# Patient Record
Sex: MALE | Race: WHITE
[De-identification: names, ages, dates, MRNs, and addresses within clinical notes are randomized per-mention and may not be internally consistent; named-entity substitution may affect disease eponyms.]

---

## 2017-05-06 ENCOUNTER — HOSPITAL ENCOUNTER (EMERGENCY)
Dept: HOSPITAL 10 - FTE | Age: 22
Discharge: LEFT BEFORE DISCHARGE | End: 2017-05-06
Payer: SELF-PAY

## 2017-05-06 DIAGNOSIS — Z53.21: Primary | ICD-10-CM

## 2017-05-12 ENCOUNTER — HOSPITAL ENCOUNTER (INPATIENT)
Dept: HOSPITAL 10 - FTE | Age: 22
LOS: 15 days | Discharge: HOME | DRG: 443 | End: 2017-05-27
Attending: INTERNAL MEDICINE | Admitting: INTERNAL MEDICINE
Payer: COMMERCIAL

## 2017-05-12 VITALS
WEIGHT: 144.4 LBS | HEIGHT: 71 IN | HEIGHT: 71 IN | WEIGHT: 144.4 LBS | BODY MASS INDEX: 20.22 KG/M2 | BODY MASS INDEX: 20.22 KG/M2

## 2017-05-12 DIAGNOSIS — R97.0: ICD-10-CM

## 2017-05-12 DIAGNOSIS — R97.8: ICD-10-CM

## 2017-05-12 DIAGNOSIS — K75.4: Primary | ICD-10-CM

## 2017-05-12 DIAGNOSIS — M13.0: ICD-10-CM

## 2017-05-12 DIAGNOSIS — D64.9: ICD-10-CM

## 2017-05-12 DIAGNOSIS — R63.4: ICD-10-CM

## 2017-05-12 LAB
ADD SCAN DIFF: NO
ALBUMIN SERPL-MCNC: 2.6 G/DL (ref 3.3–4.9)
ALBUMIN/GLOB SERPL: 0.6 {RATIO}
ALP SERPL-CCNC: 239 IU/L (ref 42–121)
ALT SERPL-CCNC: 574 IU/L (ref 13–69)
ANION GAP SERPL CALC-SCNC: 12 MMOL/L (ref 8–16)
AST SERPL-CCNC: 735 IU/L (ref 15–46)
BASOPHILS # BLD AUTO: 0 10^3/UL (ref 0–0.1)
BASOPHILS NFR BLD: 0.3 % (ref 0–2)
BILIRUB DIRECT SERPL-MCNC: 3.6 MG/DL (ref 0–0.2)
BILIRUB SERPL-MCNC: 4.8 MG/DL (ref 0.2–1.3)
BUN SERPL-MCNC: 9 MG/DL (ref 7–20)
CALCIUM SERPL-MCNC: 8.4 MG/DL (ref 8.4–10.2)
CHLORIDE SERPL-SCNC: 98 MMOL/L (ref 97–110)
CO2 SERPL-SCNC: 29 MMOL/L (ref 21–31)
CREAT SERPL-MCNC: 0.58 MG/DL (ref 0.61–1.24)
DEPRECATED HBV CORE AB SER IA-ACNC: NEGATIVE
EOSINOPHIL # BLD: 0.4 10^3/UL (ref 0–0.5)
EOSINOPHIL NFR BLD: 6.1 % (ref 0–7)
ERYTHROCYTE [DISTWIDTH] IN BLOOD BY AUTOMATED COUNT: 15.7 % (ref 11.5–14.5)
GLOBULIN SER-MCNC: 4.3 G/DL (ref 1.3–3.2)
GLUCOSE SERPL-MCNC: 87 MG/DL (ref 70–220)
HAAIG REFLEX: (no result)
HCT VFR BLD CALC: 41.7 % (ref 42–52)
HGB BLD-MCNC: 13.7 G/DL (ref 14–18)
INR PPP: 1.39
LYMPHOCYTES # BLD AUTO: 1.6 10^3/UL (ref 0.8–2.9)
LYMPHOCYTES NFR BLD AUTO: 24.9 % (ref 15–51)
MCH RBC QN AUTO: 28.2 PG (ref 29–33)
MCHC RBC AUTO-ENTMCNC: 32.9 G/DL (ref 32–37)
MCV RBC AUTO: 86 FL (ref 82–101)
MONOCYTES # BLD: 0.9 10^3/UL (ref 0.3–0.9)
MONOCYTES NFR BLD: 13.1 % (ref 0–11)
NEUTROPHILS # BLD: 3.6 10^3/UL (ref 1.6–7.5)
NEUTROPHILS NFR BLD AUTO: 55.1 % (ref 39–77)
NRBC # BLD MANUAL: 0 10^3/UL (ref 0–0)
NRBC BLD QL: 0 /100WBC (ref 0–0)
PLATELET # BLD: 200 10^3/UL (ref 140–415)
PMV BLD AUTO: 11.7 FL (ref 7.4–10.4)
POTASSIUM SERPL-SCNC: 4.1 MMOL/L (ref 3.5–5.1)
PROT SERPL-MCNC: 6.9 G/DL (ref 6.1–8.1)
PROTHROMBIN TIME: 17.1 SEC (ref 12.2–14.2)
PT RATIO: 1.3
RBC # BLD AUTO: 4.85 10^6/UL (ref 4.7–6.1)
SODIUM SERPL-SCNC: 135 MMOL/L (ref 135–144)
WBC # BLD AUTO: 6.5 10^3/UL (ref 4.8–10.8)

## 2017-05-12 PROCEDURE — 85610 PROTHROMBIN TIME: CPT

## 2017-05-12 PROCEDURE — 74181 MRI ABDOMEN W/O CONTRAST: CPT

## 2017-05-12 PROCEDURE — 86226 DNA ANTIBODY SINGLE STRAND: CPT

## 2017-05-12 PROCEDURE — 83010 ASSAY OF HAPTOGLOBIN QUANT: CPT

## 2017-05-12 PROCEDURE — 82105 ALPHA-FETOPROTEIN SERUM: CPT

## 2017-05-12 PROCEDURE — 84450 TRANSFERASE (AST) (SGOT): CPT

## 2017-05-12 PROCEDURE — P9059 PLASMA, FRZ BETWEEN 8-24HOUR: HCPCS

## 2017-05-12 PROCEDURE — 86803 HEPATITIS C AB TEST: CPT

## 2017-05-12 PROCEDURE — 81003 URINALYSIS AUTO W/O SCOPE: CPT

## 2017-05-12 PROCEDURE — 84100 ASSAY OF PHOSPHORUS: CPT

## 2017-05-12 PROCEDURE — 82607 VITAMIN B-12: CPT

## 2017-05-12 PROCEDURE — 84443 ASSAY THYROID STIM HORMONE: CPT

## 2017-05-12 PROCEDURE — 96375 TX/PRO/DX INJ NEW DRUG ADDON: CPT

## 2017-05-12 PROCEDURE — 76705 ECHO EXAM OF ABDOMEN: CPT

## 2017-05-12 PROCEDURE — 86301 IMMUNOASSAY TUMOR CA 19-9: CPT

## 2017-05-12 PROCEDURE — 83615 LACTATE (LD) (LDH) ENZYME: CPT

## 2017-05-12 PROCEDURE — 87496 CYTOMEG DNA AMP PROBE: CPT

## 2017-05-12 PROCEDURE — 82248 BILIRUBIN DIRECT: CPT

## 2017-05-12 PROCEDURE — 86880 COOMBS TEST DIRECT: CPT

## 2017-05-12 PROCEDURE — 86709 HEPATITIS A IGM ANTIBODY: CPT

## 2017-05-12 PROCEDURE — 86885 COOMBS TEST INDIRECT QUAL: CPT

## 2017-05-12 PROCEDURE — 87338 HPYLORI STOOL AG IA: CPT

## 2017-05-12 PROCEDURE — 83921 ORGANIC ACID SINGLE QUANT: CPT

## 2017-05-12 PROCEDURE — 84075 ASSAY ALKALINE PHOSPHATASE: CPT

## 2017-05-12 PROCEDURE — 85045 AUTOMATED RETICULOCYTE COUNT: CPT

## 2017-05-12 PROCEDURE — 74177 CT ABD & PELVIS W/CONTRAST: CPT

## 2017-05-12 PROCEDURE — 36430 TRANSFUSION BLD/BLD COMPNT: CPT

## 2017-05-12 PROCEDURE — 76942 ECHO GUIDE FOR BIOPSY: CPT

## 2017-05-12 PROCEDURE — 74330 X-RAY BILE/PANC ENDOSCOPY: CPT

## 2017-05-12 PROCEDURE — 36415 COLL VENOUS BLD VENIPUNCTURE: CPT

## 2017-05-12 PROCEDURE — 86255 FLUORESCENT ANTIBODY SCREEN: CPT

## 2017-05-12 PROCEDURE — 83540 ASSAY OF IRON: CPT

## 2017-05-12 PROCEDURE — 87340 HEPATITIS B SURFACE AG IA: CPT

## 2017-05-12 PROCEDURE — 85651 RBC SED RATE NONAUTOMATED: CPT

## 2017-05-12 PROCEDURE — 86901 BLOOD TYPING SEROLOGIC RH(D): CPT

## 2017-05-12 PROCEDURE — 86703 HIV-1/HIV-2 1 RESULT ANTBDY: CPT

## 2017-05-12 PROCEDURE — 86850 RBC ANTIBODY SCREEN: CPT

## 2017-05-12 PROCEDURE — 84460 ALANINE AMINO (ALT) (SGPT): CPT

## 2017-05-12 PROCEDURE — 86038 ANTINUCLEAR ANTIBODIES: CPT

## 2017-05-12 PROCEDURE — 85730 THROMBOPLASTIN TIME PARTIAL: CPT

## 2017-05-12 PROCEDURE — 80076 HEPATIC FUNCTION PANEL: CPT

## 2017-05-12 PROCEDURE — 82378 CARCINOEMBRYONIC ANTIGEN: CPT

## 2017-05-12 PROCEDURE — 83690 ASSAY OF LIPASE: CPT

## 2017-05-12 PROCEDURE — 83735 ASSAY OF MAGNESIUM: CPT

## 2017-05-12 PROCEDURE — 82728 ASSAY OF FERRITIN: CPT

## 2017-05-12 PROCEDURE — 82247 BILIRUBIN TOTAL: CPT

## 2017-05-12 PROCEDURE — 88313 SPECIAL STAINS GROUP 2: CPT

## 2017-05-12 PROCEDURE — 80061 LIPID PANEL: CPT

## 2017-05-12 PROCEDURE — 88307 TISSUE EXAM BY PATHOLOGIST: CPT

## 2017-05-12 PROCEDURE — 82150 ASSAY OF AMYLASE: CPT

## 2017-05-12 PROCEDURE — 83090 ASSAY OF HOMOCYSTEINE: CPT

## 2017-05-12 PROCEDURE — 96374 THER/PROPH/DIAG INJ IV PUSH: CPT

## 2017-05-12 PROCEDURE — 86704 HEP B CORE ANTIBODY TOTAL: CPT

## 2017-05-12 PROCEDURE — 86664 EPSTEIN-BARR NUCLEAR ANTIGEN: CPT

## 2017-05-12 PROCEDURE — 80053 COMPREHEN METABOLIC PANEL: CPT

## 2017-05-12 PROCEDURE — 86140 C-REACTIVE PROTEIN: CPT

## 2017-05-12 PROCEDURE — 85025 COMPLETE CBC W/AUTO DIFF WBC: CPT

## 2017-05-12 PROCEDURE — 86900 BLOOD TYPING SEROLOGIC ABO: CPT

## 2017-05-12 PROCEDURE — 82746 ASSAY OF FOLIC ACID SERUM: CPT

## 2017-05-12 RX ADMIN — ALBUTEROL SULFATE SCH PUFF: 90 AEROSOL, METERED RESPIRATORY (INHALATION) at 02:30

## 2017-05-12 NOTE — RADRPT
PROCEDURE:   US Abdomen. 

 

CLINICAL INDICATION:   abdominal pain 

 

TECHNIQUE:   Multiple real-time images were acquired of the patient's right upper quadrant abdomen a
nd retroperitoneum utilizing a high resolution transducer. 

 

COMPARISON:   None 

 

FINDINGS:

 

The liver demonstrates normal echogenicity.  The liver is normal in size and no focal solid lesions 
are seen. The liver measures 15.2 cm in length. The portal vein is patent with normal direction of f
low.  No intrahepatic biliary dilatation is seen. 

 

The gallbladder is contracted and not well seen.  No gallstones are identified within the gallbladde
r.  There is no pericholecystic fluid or gallbladder wall thickening. The common bile duct measures 
3 mm in maximal dimension.  

 

The pancreas appears enlarged and prominent.

 

No free fluid is identified.

 

The right kidney is normal in size, and demonstrate normal echogenicity and cortical thickness.  The
 right kidney measures 10.4 cm in long dimension.  There is no evidence of hydronephrosis. There are
 no kidney stones.  

 

There is a trace amount of right perinephric fluid.

 

 

RPTAT: AA

 

IMPRESSION:

 

Limited visualization of a contracted gallbladder.

No evidence of gallstones.

Possible trace amount of right perinephric fluid.

Enlarged and prominent pancreas.  Rule out pancreatitis.  Further evaluation with CT is recommended.

_____________________________________________ 

.Tyler Jean MD, MD           Date    Time 

Electronically viewed and signed by .Tyler Jean MD, MD on 05/12/2017 18:15 

 

D:  05/12/2017 18:15  T:  05/12/2017 18:15

.S/

## 2017-05-12 NOTE — RADRPT
PROCEDURE:   CT Abdomen and Pelvis with contrast. 

 

CLINICAL INDICATION:   Abdominal pain jaundice 

 

TECHNIQUE:   CT scan of the abdomen and pelvis with contrast was performed on a multi-detector high-
resolution CT scanner.  The patient was scanned following the intravenous administration of 98 cc of
 Visipaque 320.  Coronal and sagittal reformatted images were obtained from the axial source images.
 Images were reviewed on a high-resolution PACS workstation. The total exam CTDI equals 4.83 mGy and
 the total exam DLP equals 308.52 mGy-cm.

 

One or more the following dose reduction techniques were utilized: Automated exposure control, adjus
tment of the mA/ or kV according to patient's size, or use of iterative reconstruction technique.

 

COMPARISON:   Right upper quadrant abdominal ultrasound of 05/12/2017 

 

FINDINGS:

 

CT abdomen:

 

The lung bases are clear.  The heart size is normal, without pericardial thickening or effusion. Non
specific mild periportal edema.  The length of the right lobe of the liver equals 20.2 cm which coul
d be secondary to Reidel's lobe, normal variant.  The spleen is normal in size and homogeneous in de
nsity.  The stomach is partially collapsed, but is grossly unremarkable.  The pancreas as visualized
 is normal.  There is appearance of likely diffuse gallbladder wall edema.  The adrenal glands are s
ymmetric and normal.  The kidneys are symmetrically unremarkable..  No renal calculus or obstructive
 uropathy or mass lesion is seen.  

 

The aorta is of normal caliber.   There is no retroperitoneal lymphadenopathy.    Small amount of as
cites.

 

CT pelvis:

 

The small bowel loops situated within the pelvis are unremarkable.  The pelvic organs are normal.  T
he pelvic sidewalls and inguinal regions are clear.  The sigmoid colon and rectum are unremarkable. 
 No mass or lymphadenopathy is seen.  Small amount of ascites.  The bladder is normal.

 

There is appearance of mild curvature of the lumbar spine with convexity to the left which could be 
positional. No osteolytic or osteoblastic lesion is detected.  

 

IMPRESSION:

Diffuse gallbladder wall edema.  Nonspecific mild periportal edema. Small amount of ascites. Finding
s could be secondary to hepatitis, hepatic congestion. Please see above.

 

 

RPTAT: HJES

_____________________________________________ 

.Luis E Ruiz MD, MD           Date    Time 

Electronically viewed and signed by .Luis E Ruiz MD, MD on 05/12/2017 20:56 

 

D:  05/12/2017 20:56  T:  05/12/2017 20:56

.S/

## 2017-05-12 NOTE — CONS
DATE OF ADMISSION: 05/12/2017

DATE OF CONSULTATION:  

 

 

 

Dear Dr. Herrera:

 

I thank you very much for this kind referral.

 

HISTORY OF PRESENT ILLNESS:  Mr. Yadira Sagastume is a 22-year-old male patient who 
has been referred to me for further evaluation of abdominal pain associated 
with vomiting.  There is no past history of peptic ulcer disease.  He is not 
taking any nonsteroidal anti-inflammatory agents.  His appetite has been poor 
and he has been losing weight.  There is no history of gallstones.  He does not 
have any fever, chills or jaundice.  There is no history of liver disease.  The 
patient denies any change in the bowel habit or rectal bleeding.  He is not a 
hypertensive or diabetic.  He does not have any heart disease or lung problem.  
There is no history of kidney disease.

 

SOCIAL HISTORY:  He is a nonsmoker.  He does not abuse alcohol.

 

FAMILY HISTORY:  Negative for gastrointestinal tract neoplasm and liver disease.

 

ALLERGIES:  THERE IS NO HISTORY OF SIGNIFICANT DRUG ALLERGY.

 

MEDICATIONS:  None.

 

PHYSICAL EXAMINATION:

GENERAL:  He is 5 feet 11 inches tall and he weighs 135 pounds.

HEART:  Examination of the heart reveals normal first and second heart sounds.

LUNGS:  Clear.

ABDOMEN:  Soft without any distention.  Liver and spleen are not palpable.  
There are no masses.  There is no focal tenderness.  Normal bowel sounds are 
heard.

CENTRAL NERVOUS SYSTEM:  Does not reveal any focal neurological deficit.

EYES:  The patient is icteric.

 

IMPRESSION:

1.  Patient has jaundice.

2.  Upper abdominal pain and vomiting.

3.  Weight loss.

 

PLAN:

1.  CMP which includes liver enzymes.

2.  Abdominal ultrasound for further evaluation.

3.  If the vomiting continues, patient will need endoscopy examination.

 

I thank you once again.

 

With warmest personal regards,

 

 

Dictated By: VALENCIA WOODS/ELIOT

DD:    05/12/2017 17:01:37

DT:    05/12/2017 17:43:54

Conf#: [id number]

DID#:  [id number]

 

[email or web address]

## 2017-05-12 NOTE — QN
Documentation


Comment


My independent concise history is jaundice.  My pertinent physical exam 

findings are jaundice.  The plan is admission to Dr. Joe as the patient has 

preferred [employer] insurance.











MAXIMINO ACOSTA MD May 12, 2017 21:50

## 2017-05-12 NOTE — HP
Date/Time of Note


Date/Time of Note


DATE: 5/12/17 


TIME: 22:05





HPI/ROS


Admit Date/Time


Admit Date/Time








Hx of Present Illness





*******************PLEASE SEE THE DICTATED H&P**************************





Exam/Review of Systems


Vital Signs


Vitals





 Vital Signs








  Date Time  Temp Pulse Resp B/P Pulse Ox O2 Delivery O2 Flow Rate FiO2


 


5/12/17 17:11 98.1 71 18 174/67 99   











Labs


Result Diagram:  


5/12/17 1835                                                                   

             5/12/17 1835














JAD MOORE MD May 12, 2017 22:05

## 2017-05-13 VITALS — SYSTOLIC BLOOD PRESSURE: 99 MMHG | RESPIRATION RATE: 18 BRPM | HEART RATE: 66 BPM | DIASTOLIC BLOOD PRESSURE: 53 MMHG

## 2017-05-13 VITALS — TEMPERATURE: 98.2 F

## 2017-05-13 VITALS — DIASTOLIC BLOOD PRESSURE: 51 MMHG | SYSTOLIC BLOOD PRESSURE: 93 MMHG | RESPIRATION RATE: 18 BRPM

## 2017-05-13 VITALS — DIASTOLIC BLOOD PRESSURE: 53 MMHG | SYSTOLIC BLOOD PRESSURE: 92 MMHG | RESPIRATION RATE: 20 BRPM

## 2017-05-13 LAB
ADD SCAN DIFF: NO
ALBUMIN SERPL-MCNC: 2 G/DL (ref 3.3–4.9)
ALBUMIN/GLOB SERPL: 0.58 {RATIO}
ALP SERPL-CCNC: 178 IU/L (ref 42–121)
ALT SERPL-CCNC: 450 IU/L (ref 13–69)
ANION GAP SERPL CALC-SCNC: 6 MMOL/L (ref 8–16)
AST SERPL-CCNC: 569 IU/L (ref 15–46)
BASOPHILS # BLD AUTO: 0 10^3/UL (ref 0–0.1)
BASOPHILS NFR BLD: 0.6 % (ref 0–2)
BILIRUB DIRECT SERPL-MCNC: 3.3 MG/DL (ref 0–0.2)
BILIRUB SERPL-MCNC: 4.5 MG/DL (ref 0.2–1.3)
BUN SERPL-MCNC: 9 MG/DL (ref 7–20)
CALCIUM SERPL-MCNC: 7.8 MG/DL (ref 8.4–10.2)
CHLORIDE SERPL-SCNC: 105 MMOL/L (ref 97–110)
CO2 SERPL-SCNC: 25 MMOL/L (ref 21–31)
CREAT SERPL-MCNC: 0.65 MG/DL (ref 0.61–1.24)
EOSINOPHIL # BLD: 0.3 10^3/UL (ref 0–0.5)
EOSINOPHIL NFR BLD: 6.7 % (ref 0–7)
ERYTHROCYTE [DISTWIDTH] IN BLOOD BY AUTOMATED COUNT: 15.7 % (ref 11.5–14.5)
GLOBULIN SER-MCNC: 3.4 G/DL (ref 1.3–3.2)
GLUCOSE SERPL-MCNC: 98 MG/DL (ref 70–220)
HCT VFR BLD CALC: 33.8 % (ref 42–52)
HGB BLD-MCNC: 11.2 G/DL (ref 14–18)
LYMPHOCYTES # BLD AUTO: 1.2 10^3/UL (ref 0.8–2.9)
LYMPHOCYTES NFR BLD AUTO: 24.5 % (ref 15–51)
MAGNESIUM SERPL-MCNC: 1.7 MG/DL (ref 1.7–2.5)
MCH RBC QN AUTO: 28.1 PG (ref 29–33)
MCHC RBC AUTO-ENTMCNC: 33.1 G/DL (ref 32–37)
MCV RBC AUTO: 84.9 FL (ref 82–101)
MONOCYTES # BLD: 0.7 10^3/UL (ref 0.3–0.9)
MONOCYTES NFR BLD: 14.6 % (ref 0–11)
NEUTROPHILS # BLD: 2.6 10^3/UL (ref 1.6–7.5)
NEUTROPHILS NFR BLD AUTO: 53.2 % (ref 39–77)
NRBC # BLD MANUAL: 0 10^3/UL (ref 0–0)
NRBC BLD QL: 0 /100WBC (ref 0–0)
PHOSPHATE SERPL-MCNC: 3.8 MG/DL (ref 2.5–4.9)
PLATELET # BLD: 157 10^3/UL (ref 140–415)
PMV BLD AUTO: 12.8 FL (ref 7.4–10.4)
POTASSIUM SERPL-SCNC: 4 MMOL/L (ref 3.5–5.1)
PROT SERPL-MCNC: 5.4 G/DL (ref 6.1–8.1)
RBC # BLD AUTO: 3.98 10^6/UL (ref 4.7–6.1)
SODIUM SERPL-SCNC: 132 MMOL/L (ref 135–144)
WBC # BLD AUTO: 4.9 10^3/UL (ref 4.8–10.8)

## 2017-05-13 RX ADMIN — ALBUTEROL SULFATE SCH PUFF: 90 AEROSOL, METERED RESPIRATORY (INHALATION) at 02:00

## 2017-05-13 RX ADMIN — FOLIC ACID SCH MLS/HR: 5 INJECTION, SOLUTION INTRAMUSCULAR; INTRAVENOUS; SUBCUTANEOUS at 11:28

## 2017-05-13 RX ADMIN — FAMOTIDINE SCH MG: 20 TABLET ORAL at 20:41

## 2017-05-13 RX ADMIN — ALBUTEROL SULFATE SCH PUFF: 90 AEROSOL, METERED RESPIRATORY (INHALATION) at 08:00

## 2017-05-13 RX ADMIN — ALBUTEROL SULFATE SCH PUFF: 90 AEROSOL, METERED RESPIRATORY (INHALATION) at 14:00

## 2017-05-13 RX ADMIN — ALBUTEROL SULFATE SCH PUFF: 90 AEROSOL, METERED RESPIRATORY (INHALATION) at 20:00

## 2017-05-13 RX ADMIN — FAMOTIDINE SCH MG: 20 TABLET ORAL at 09:28

## 2017-05-13 NOTE — RADRPT
PROCEDURE:   MRI Abdomen without intravenous contrast. 

 

CLINICAL INDICATION:   Hepatitis, abdominal pain

 

TECHNIQUE:   MRI of the abdomen was performed.  The patient was examined without IV contrast. Images
 were reviewed on a high-resolution PACS workstation.

 

COMPARISON:   CT and ultrasound, 05/12/2017  

 

FINDINGS:

 

Hepatomegaly is noted measuring 20 cm.  No focal hepatic mass is identified.  Hepatic periportal germania
ma is noted, which can be seen in the setting of hepatitis.  Gallbladder is contracted, and demonstr
ates circumferential wall edema, likely reactive secondary to underlying liver disease.  No gallston
e is seen.  Splenomegaly is noted measuring 14 cm.  Biliary tree, pancreas, adrenal glands and kidne
ys are unremarkable.  There is no obstructive uropathy.  The stomach is grossly unremarkable.

 

Abdominal aorta is normal in caliber.  There is no retroperitoneal or lacy hepatis lymphadenopathy.
  Mild upper abdominal ascites is noted.  No bowel obstruction or evidence of abscess is identified.
  The surrounding osseous structures are unremarkable.  There is mild anasarca. 

 

IMPRESSION:

 

1.  Hepatomegaly is noted.  Hepatic periportal edema is identified, which can be seen in the setting
 of hepatitis.

2.  Splenomegaly is noted measuring 14 cm.

3.  There is mild upper abdominal ascites.

4.  Circumferential gallbladder wall thickening is noted, likely reactive secondary to underlying li
natacha disease.  No evidence of cholelithiasis, cholecystitis or biliary dilatation is identified.

5.  There is mild anasarca.

 

RPTAT: QQ

_____________________________________________ 

.Lior Bai MD, MD           Date    Time 

Electronically viewed and signed by .Lior Bai MD, MD on 05/13/2017 15:27 

 

D:  05/13/2017 15:27  T:  05/13/2017 15:27

.R/

## 2017-05-13 NOTE — CONS
Date/Time of Note


Date/Time of Note


DATE: 5/13/17 


TIME: 12:28





Assessment/Plan


Assessment/Plan


Additional Assessment/Plan


Transaminitis/jaundice


Evaluate for pancreatitis versus autoimmune etiology versus transient elevation


Rule out choledocholithiasis 


IVF Hydration


Nausea and pain control


Review MRCP, autoimmune markers, hep A


ERCP if clinically indicated, pt advised of R/B/A of procedure and she provides 

informed consent to proceed


Monitor LFTs, amylase, lipase


Surgery following





Asthma


Management per Primary





Joint pain


Management per Primary


May require rheumatology consult


Autoimmune markers in process








Further recommendations depend on clinical course


Patient seen in collaboration with Dr. Pritchard





Consultation Date/Type/Reason


Admit Date/Time





Type of Consultation:  Gastroenterology


Reason for Consultation


Transaminitis





Hx of Present Illness


Mr.Eyasu Sagastume is a 22-year-old male with a history of asthma who presented to 

the emergency room for further evaluation of nausea, vomiting, and abdominal 

pain.  Patient denies hematemesis, fever, chills, diarrhea, hematochezia, sick 

contacts, and recent travel outside of the .  Patient reports intermittent 

vomiting with chronic nausea and abdominal pain for the last 2 weeks.  He also 

reports losing 4-6 pounds over this period.  He reports previous history of 

abdominal pain for the last year with 50 pound weight loss. About a year ago he 

used to weigh 185 pounds and he intentionally wanted to lose about 20 pounds, 

but he continued to lose weight because of the vomiting and the decreased p.o. 

intake. Patient also has a history of joint pains and swelling for the last 

year as well.  Patient states last travel to [facility] was a year ago.  Patient 

said he was in the process of getting additional workup for autoimmune causes 

of joint pains.  Patient denies diagnosis of lupus.





Social History


Smoking Status:  Never smoker





Exam/Review of Systems


Vital Signs


Vitals





 Vital Signs








  Date Time  Temp Pulse Resp B/P Pulse Ox O2 Delivery O2 Flow Rate FiO2


 


5/13/17 08:00 98.2 66 20 92/53 98   


 


5/13/17 03:00      Room Air  














 Intake and Output   


 


 5/12/17 5/12/17 5/13/17





 15:00 23:00 07:00


 


Intake Total   600 ml


 


Output Total   500 ml


 


Balance   100 ml











Exam


Constitutional:  alert, oriented, well developed, thin


Psych:  nl mood/affect


Head:  normocephalic


Eyes:  EOMI, nl conjunctiva, nl lids, anicteric sclera


ENMT:  nl external ears & nose, nl lips & teeth, nl nasal mucosa & septum


Respiratory:  clear to auscultation, normal air movement


Cardiovascular:  regular rate and rhythm


Gastrointestinal:  soft, non-tender


Musculoskeletal:  nl extremities to inspection


Neurological:  CNS II-XII intact





Results


Result Diagram:  


5/13/17 0501                                                                   

             5/13/17 0501





Results 24 hrs





Laboratory Tests








Test


  5/12/17


18:35 5/12/17


18:42 5/13/17


05:01


 


White Blood Count 6.5    4.9  #


 


Red Blood Count 4.85    3.98  L


 


Hemoglobin 13.7  L  11.2  L


 


Hematocrit 41.7  L  33.8  L


 


Mean Corpuscular Volume 86.0    84.9  


 


Mean Corpuscular Hemoglobin 28.2  L  28.1  L


 


Mean Corpuscular Hemoglobin


Concent 32.9  


  


  33.1  


 


 


Red Cell Distribution Width 15.7  H  15.7  H


 


Platelet Count 200    157  #


 


Mean Platelet Volume 11.7  H  12.8  H


 


Neutrophils % 55.1    53.2  


 


Lymphocytes % 24.9    24.5  


 


Monocytes % 13.1  H  14.6  H


 


Eosinophils % 6.1    6.7  


 


Basophils % 0.3    0.6  


 


Nucleated Red Blood Cells % 0.0    0.0  


 


Neutrophils # 3.6    2.6  


 


Lymphocytes # 1.6    1.2  


 


Monocytes # 0.9    0.7  


 


Eosinophils # 0.4    0.3  


 


Basophils # 0.0    0.0  


 


Nucleated Red Blood Cells # 0.0    0.0  


 


Prothrombin Time 17.1  H  


 


Prothrombin Time Ratio 1.3    


 


INR International Normalized


Ratio 1.39  


  


  


 


 


Sodium Level 135    132  L


 


Potassium Level 4.1    4.0  


 


Chloride Level 98    105  


 


Carbon Dioxide Level 29    25  


 


Anion Gap 12    6  L


 


Blood Urea Nitrogen 9    9  


 


Creatinine 0.58  L  0.65  


 


Glucose Level 87    98  


 


Calcium Level 8.4    7.8  L


 


Total Bilirubin 4.8  H  4.5  H


 


Direct Bilirubin 3.60  H  3.30  H


 


Indirect Bilirubin 1.2  H  1.2  H


 


Aspartate Amino Transf


(AST/SGOT) 735  H


  


  569  H


 


 


Alanine Aminotransferase


(ALT/SGPT) 574  H


  


  450  H


 


 


Alkaline Phosphatase 239  H  178  H


 


Total Protein 6.9    5.4  #L


 


Albumin 2.6  L  2.0  L


 


Globulin 4.30  H  3.40  H


 


Albumin/Globulin Ratio 0.60    0.58  


 


Lipase 180    


 


Hepatitis B Surface Antigen NEGATIVE    


 


Hepatitis B Core Total


Antibody NEGATIVE  


  


  


 


 


Hepatitis C Antibody NEGATIVE    


 


Bedside Urine pH (LAB)  6.0   


 


Bedside Urine Protein (LAB)  1+  H 


 


Bedside Urine Glucose (UA)  0.1%  H 


 


Bedside Urine Ketones (LAB)  Negative   


 


Bedside Urine Blood  Negative   


 


Bedside Urine Nitrite (LAB)  Negative   


 


Bedside Urine Leukocyte


Esterase (L 


  Negative  


  


 


 


Phosphorus Level   3.8  


 


Magnesium Level   1.7  


 


HIV (1&2) Antibody   NEGATIVE  











Medications


Medications





 Current Medications


Ondansetron HCl (Zofran Inj) 4 mg Q6H  PRN IV NAUSEA AND/OR VOMITING;  Start 5/ 12/17 at 22:30


Morphine Sulfate (morphine) 2 mg Q4H  PRN IV SEVERE PAIN LEVEL 7-10;  Start 5/12 /17 at 22:30


Famotidine 20 mg 20 mg Q12 PO  Last administered on 5/13/17at 09:28; Admin Dose 

20 MG;  Start 5/13/17 at 09:00


Dextrose/Sodium Chloride 1,000 ml @  100 mls/hr Q10H IV ;  Start 5/13/17 at 09:

00


Sodium Chloride (NS) 1,000 ml @  100 mls/hr Q10H IV  Last administered on 5/13/ 17at 11:28; Admin Dose 100 MLS/HR;  Start 5/13/17 at 11:30;  Stop 5/14/17 at 07:

29











LARS CASTRO May 13, 2017 12:28

## 2017-05-13 NOTE — HP
DATE OF ADMISSION: 05/12/2017

 

TIME SEEN:  2300

 

CHIEF COMPLAINT:  Abdominal pain, vomiting, jaundice, and swollen hands.

 

HISTORY OF PRESENT ILLNESS:  The patient is 22-year-old [place] male with a history of asthma who 
presented to the emergency department with the above stated chief complaint.  He stated starting abo
ut 2 weeks ago he started noticing his eyes turning yellow, and about a week ago he started experien
cing abdominal pain, mainly in the right upper quadrant in the epigastric area.  He also started not
icing dark urine over the past 2 weeks.  He was actually referred to Dr. Delcid, the gastroenterolo
[facility], who actually already saw the patient.  The patient further stated that he has been well up unt
il about a year ago when he started noticing that both of his hands were swollen.  At that time, he 
also started having joint pains and generalized weakness.  He did see a doctor who told him just to 
exercise his hands.  Until this day, his hands are somehow swollen and he cannot make a fist because
 of pain and also there is some limitation.  He did travel back to [facility] for a visit about a year
 ago.  At that time had minimal diarrhea, which resolved on its own after a few days.  Along with hi
s abdominal pain over the past 2 weeks, the patient also has been vomiting which is nonbilious, nonb
loody.  The patient also stated that he had lost 50 pounds in 1 year.  About a year ago he used to w
eigh 185 pounds and he intentionally wanted to lose about 20 pounds, but he continued to lose weight
 because of the vomiting and the decreased p.o. intake.

 

When the patient presented to the ER, his blood pressure was 174/67, heart rate 71, respiratory rate
 18, temperature 98.1, oxygen saturation 99% on room air.  CBC shows a hemoglobin of 13.7.  He has a
bnormal liver chemistries with an AST of 735, , alkaline phosphatase 239.  Total bilirubin is
 4.8 with indirect bilirubin 1.2, his albumin 2.6.  He had a right upper quadrant ultrasound which s
hows possible trace amount of right perinephric fluid, enlarged and a prominent pancreas.  His gallb
ladder was contracted causing limited visualization, but there was no evidence of gallstones.  CT ab
domen and pelvis with contrast was done which showed diffuse gallbladder wall edema.  Nonspecific mi
ld periportal edema.  A small amount of ascites.  The findings could be secondary to hepatitis or he
patic congestion.  Of note, the patient a few months ago had blood tests and at that time his AST an
d ALT were in the 90s and 80s.  His REID (antinuclear antibody) was positive and hepatitis panel show
s hepatitis C antibody and hepatitis B core antibody were negative, but the hepatitis B surface anti
body was positive.  Here, we already checked hepatitis B and C and it shows that hepatitis C antibod
y, hepatitis B core antibody, and hepatitis B surface antigen are all negative.  

 

REVIEW OF SYSTEMS:  A 12-point review of systems was performed, negative except as mentioned in HPI.

 

PAST MEDICAL HISTORY:  As per HPI.

 

PAST SURGICAL HISTORY:  Denies.

 

SOCIAL HISTORY:  Denies history of tobacco, alcohol or illicit drug use.

 

ALLERGIES:  NO KNOWN DRUG ALLERGIES.

 

HOME MEDICATIONS:  Albuterol and vitamin D. 

 

PHYSICAL EXAMINATION:

GENERAL:  The patient is lying in bed, appears weak and speaking slowly.  He is alert and oriented.

HEENT:  No obvious head deformity.  There is scleral icterus.  Also, there is dermal jaundice on his
 face.

CARDIOVASCULAR:  Regular rate and rhythm with no extra sounds.

LUNGS:  Clear.

ABDOMEN:  Soft.  There is tenderness in the right upper quadrant area and to some extent in the epig
astric area with no guarding, rebound tenderness or rigidity.

EXTREMITIES:  Both of his hands are somehow swollen and the patient cannot make a fist.  His middle 
finger also shows some deformity in that he cannot fully extend it.

NEUROLOGIC:  No focal deficits.

 

LABORATORY DATA:  Pertinent positives what as mentioned in the HPI.

 

IMAGING:  Right upper quadrant ultrasound and CT abdomen and pelvis with contrast with results as me
ntioned in the HPI.

 

IMPRESSION:

1.  Jaundice.

2.  Abdominal pain.

3.  Abnormal liver enzymes.

4.  Bilateral swollen hands.

5.  History of asthma.

 

PLAN:  As mentioned in the HPI, his hepatitis panel so far have been negative, but we are waiting fo
r the acute hepatitis panel results to come back. As mentioned in the HPI, also the patient had an A
NA that was positive from outside laboratory.  Given that his hands are swollen, he has joint pain, 
it may be possible that the patient has an autoimmune disease causing autoimmune hepatitis as well. 
 We will further do some blood tests.  The patient has been followed by Dr. Delcid.  Will talk to s
deja whether or not the patient needs a liver biopsy or whether or not we should start him on a steroi
d, Pentoxifylline.  In the meantime, we will provide pain medication and antiemetics as needed.  Cur
rently there is no sign of asthma exacerbation but he will receive breathing treatments as needed.  

 

Further workup and management per clinical course.

 

 

Dictated By: JAD SLAUGHTER/ELIOT

DD:    05/13/2017 07:36:32

DT:    05/13/2017 09:11:33

Conf#: [id number]

DID#:  [id number]

## 2017-05-13 NOTE — CONS
DATE OF ADMISSION: 05/12/2017

DATE OF CONSULTATION:  

 

 

 

TYPE OF CONSULTATION: Surgical.

 

REASON FOR CONSULTATION:  Abdominal pain and jaundice.

 

HISTORY OF PRESENT ILLNESS:  The patient is a 22-year-old gentleman who started to develop jaundice 
within the last several months.  He was followed as an outpatient by Dr. Delcid, with outpatient wo
rkup initiated.  The patient presented to the emergency room yesterday with severe abdominal pain, n
ausea and vomiting and was admitted for further evaluation and treatment.  He was noted to be clinic
ally icteric.  The patient notes that over the last year he has lost 50 pounds because of inability 
to eat and nausea and he has joint pain in both hands, resulting in weakness.  This hospitalization 
his admitting bilirubin was 4.8 with an AST of 735 and an ALT of 574, with an alkaline phosphatase o
f 239.  The patient is negative for hepatitis C, B and HIV.  The patient is admitted for further katiuska
luation.  Of note, on imagings the abdomen and pelvis CT shows diffuse gallbladder wall edema with n
onspecific mild periportal edema and a small amount of ascites.  Gallbladder ultrasound shows limite
d visualization of a contracted gallbladder, but no evidence of gallstones and a possible trace amou
nt of right perinephric fluid in a large and prominent pancreas.

 

PAST MEDICAL HISTORY:  No previous abdominal surgeries or hospitalizations.

 

REVIEW OF SYSTEMS:

HEAD, EYES, EARS, NOSE, THROAT:  Within normal limits.

PULMONARY:  No history of pneumonia or shortness of breath.

CARDIAC:  No history of chest pain, MI, or arrhythmia.

ABDOMEN:  As in the HPI.

EXTREMITIES: As in the HPI.

 

OUTPATIENT MEDICATIONS:  None.

 

ALLERGIES:  NONE.

 

PHYSICAL EXAMINATION:

GENERAL:  The patient is a thin 22-year-old, [place] male, who is awake and alert, in no acute dis
tress.

HEAD, EARS, EYES, NOSE, THROAT:  Within normal limits.  Sclerae are icteric. 

LUNGS: Clear.

HEART:  Regular rhythm.

ABDOMEN: Slightly tender in the epigastric area, without masses.

EXTREMITIES:  Unremarkable.

 

LABORATORY DATA:  As noted above.

 

PLAN:  Awaiting GI consultation.  The patient may need a liver biopsy.  There are no surgical recomm
endations at this time, as the patient's process is almost certainly entirely hepatocellular.

 

 

Dictated By: SOLA BENTON/ELIOT

DD:    05/13/2017 12:39:34

DT:    05/13/2017 13:23:42

Conf#: [id number]

DID#:  [id number]

## 2017-05-13 NOTE — PN
Date/Time of Note


Date/Time of Note


DATE: 5/13/17 


TIME: 14:26





Assessment/Plan


VTE Prophylaxis


VTE Prophylaxis Intervention:  ambulation





Lines/Catheters


IV Catheter Type (from [facility]):  Saline Lock


Urinary Cath still in place:  No





Assessment/Plan


Chief Complaint/Hosp Course


1.  Jaundice with hepatitis


-Workup for autoimmune hepatitis is pending, hepatitis viral panel is negative


-GI consult is appreciated


2.  Joint pain with swollen hands-this is a chronic issue


-Patient will need outpatient follow-up with rheumatologist, autoimmune workup 

is pending


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

stranded DNA


3.  History of asthma-stable





Prophylaxis: Ambulation


Problems:  





Subjective


24 Hr Interval Summary


Musculoskeletal:  bone/joint pain





Exam/Review of Systems


Vital Signs


Vitals





 Vital Signs








  Date Time  Temp Pulse Resp B/P Pulse Ox O2 Delivery O2 Flow Rate FiO2


 


5/13/17 08:00 98.2 66 20 92/53 98   


 


5/13/17 03:00      Room Air  














 Intake and Output   


 


 5/12/17 5/12/17 5/13/17





 15:00 23:00 07:00


 


Intake Total   600 ml


 


Output Total   500 ml


 


Balance   100 ml











Exam


Constitutional:  alert, oriented


Eyes:  icteric


Respiratory:  clear to auscultation


Cardiovascular:  regular rate and rhythm


Gastrointestinal:  soft, 


   No distended


Musculoskeletal:  nl extremities to inspection





Results


Result Diagram:  


5/13/17 0501                                                                   

             5/13/17 0501





Results 24 hrs





Laboratory Tests








Test


  5/12/17


18:35 5/12/17


18:42 5/13/17


05:01


 


White Blood Count 6.5    4.9  #


 


Red Blood Count 4.85    3.98  L


 


Hemoglobin 13.7  L  11.2  L


 


Hematocrit 41.7  L  33.8  L


 


Mean Corpuscular Volume 86.0    84.9  


 


Mean Corpuscular Hemoglobin 28.2  L  28.1  L


 


Mean Corpuscular Hemoglobin


Concent 32.9  


  


  33.1  


 


 


Red Cell Distribution Width 15.7  H  15.7  H


 


Platelet Count 200    157  #


 


Mean Platelet Volume 11.7  H  12.8  H


 


Neutrophils % 55.1    53.2  


 


Lymphocytes % 24.9    24.5  


 


Monocytes % 13.1  H  14.6  H


 


Eosinophils % 6.1    6.7  


 


Basophils % 0.3    0.6  


 


Nucleated Red Blood Cells % 0.0    0.0  


 


Neutrophils # 3.6    2.6  


 


Lymphocytes # 1.6    1.2  


 


Monocytes # 0.9    0.7  


 


Eosinophils # 0.4    0.3  


 


Basophils # 0.0    0.0  


 


Nucleated Red Blood Cells # 0.0    0.0  


 


Prothrombin Time 17.1  H  


 


Prothrombin Time Ratio 1.3    


 


INR International Normalized


Ratio 1.39  


  


  


 


 


Sodium Level 135    132  L


 


Potassium Level 4.1    4.0  


 


Chloride Level 98    105  


 


Carbon Dioxide Level 29    25  


 


Anion Gap 12    6  L


 


Blood Urea Nitrogen 9    9  


 


Creatinine 0.58  L  0.65  


 


Glucose Level 87    98  


 


Calcium Level 8.4    7.8  L


 


Total Bilirubin 4.8  H  4.5  H


 


Direct Bilirubin 3.60  H  3.30  H


 


Indirect Bilirubin 1.2  H  1.2  H


 


Aspartate Amino Transf


(AST/SGOT) 735  H


  


  569  H


 


 


Alanine Aminotransferase


(ALT/SGPT) 574  H


  


  450  H


 


 


Alkaline Phosphatase 239  H  178  H


 


Total Protein 6.9    5.4  #L


 


Albumin 2.6  L  2.0  L


 


Globulin 4.30  H  3.40  H


 


Albumin/Globulin Ratio 0.60    0.58  


 


Lipase 180    


 


Hepatitis B Surface Antigen NEGATIVE    


 


Hepatitis B Core Total


Antibody NEGATIVE  


  


  


 


 


Hepatitis C Antibody NEGATIVE    


 


Bedside Urine pH (LAB)  6.0   


 


Bedside Urine Protein (LAB)  1+  H 


 


Bedside Urine Glucose (UA)  0.1%  H 


 


Bedside Urine Ketones (LAB)  Negative   


 


Bedside Urine Blood  Negative   


 


Bedside Urine Nitrite (LAB)  Negative   


 


Bedside Urine Leukocyte


Esterase (L 


  Negative  


  


 


 


Phosphorus Level   3.8  


 


Magnesium Level   1.7  


 


CA 19-9 Antigen   869.0  H


 


HIV (1&2) Antibody   NEGATIVE  











Medications


Medications





 Current Medications


Ondansetron HCl (Zofran Inj) 4 mg Q6H  PRN IV NAUSEA AND/OR VOMITING;  Start 5/ 12/17 at 22:30


Morphine Sulfate (morphine) 2 mg Q4H  PRN IV SEVERE PAIN LEVEL 7-10;  Start 5/12 /17 at 22:30


Famotidine 20 mg 20 mg Q12 PO  Last administered on 5/13/17at 09:28; Admin Dose 

20 MG;  Start 5/13/17 at 09:00


Sodium Chloride (NS) 1,000 ml @  100 mls/hr Q10H IV  Last administered on 5/13/ 17at 11:28; Admin Dose 100 MLS/HR;  Start 5/13/17 at 11:30;  Stop 5/14/17 at 07:

29











ANTONIO ALEMAN May 13, 2017 14:29

## 2017-05-14 VITALS — SYSTOLIC BLOOD PRESSURE: 95 MMHG | DIASTOLIC BLOOD PRESSURE: 58 MMHG | RESPIRATION RATE: 16 BRPM

## 2017-05-14 VITALS — SYSTOLIC BLOOD PRESSURE: 85 MMHG | RESPIRATION RATE: 18 BRPM | DIASTOLIC BLOOD PRESSURE: 50 MMHG

## 2017-05-14 LAB
ADD SCAN DIFF: NO
ALBUMIN SERPL-MCNC: 2 G/DL (ref 3.3–4.9)
ALBUMIN/GLOB SERPL: 0.58 {RATIO}
ALP SERPL-CCNC: 176 IU/L (ref 42–121)
ALT SERPL-CCNC: 429 IU/L (ref 13–69)
ANION GAP SERPL CALC-SCNC: 5 MMOL/L (ref 8–16)
AST SERPL-CCNC: 572 IU/L (ref 15–46)
BASOPHILS # BLD AUTO: 0 10^3/UL (ref 0–0.1)
BASOPHILS NFR BLD: 0.3 % (ref 0–2)
BILIRUB DIRECT SERPL-MCNC: 3.3 MG/DL (ref 0–0.2)
BILIRUB SERPL-MCNC: 4.2 MG/DL (ref 0.2–1.3)
BUN SERPL-MCNC: 9 MG/DL (ref 7–20)
CALCIUM SERPL-MCNC: 7.8 MG/DL (ref 8.4–10.2)
CHLORIDE SERPL-SCNC: 105 MMOL/L (ref 97–110)
CO2 SERPL-SCNC: 26 MMOL/L (ref 21–31)
CREAT SERPL-MCNC: 0.57 MG/DL (ref 0.61–1.24)
EOSINOPHIL # BLD: 0.5 10^3/UL (ref 0–0.5)
EOSINOPHIL NFR BLD: 7.8 % (ref 0–7)
ERYTHROCYTE [DISTWIDTH] IN BLOOD BY AUTOMATED COUNT: 15.7 % (ref 11.5–14.5)
GLOBULIN SER-MCNC: 3.4 G/DL (ref 1.3–3.2)
GLUCOSE SERPL-MCNC: 81 MG/DL (ref 70–220)
HCT VFR BLD CALC: 33.3 % (ref 42–52)
HGB BLD-MCNC: 11.3 G/DL (ref 14–18)
LYMPHOCYTES # BLD AUTO: 1.8 10^3/UL (ref 0.8–2.9)
LYMPHOCYTES NFR BLD AUTO: 30.5 % (ref 15–51)
MCH RBC QN AUTO: 28.5 PG (ref 29–33)
MCHC RBC AUTO-ENTMCNC: 33.9 G/DL (ref 32–37)
MCV RBC AUTO: 84.1 FL (ref 82–101)
MONOCYTES # BLD: 0.8 10^3/UL (ref 0.3–0.9)
MONOCYTES NFR BLD: 13.3 % (ref 0–11)
NEUTROPHILS # BLD: 2.8 10^3/UL (ref 1.6–7.5)
NEUTROPHILS NFR BLD AUTO: 47.9 % (ref 39–77)
NRBC # BLD MANUAL: 0 10^3/UL (ref 0–0)
NRBC BLD QL: 0 /100WBC (ref 0–0)
PLATELET # BLD: 166 10^3/UL (ref 140–415)
PMV BLD AUTO: 12.2 FL (ref 7.4–10.4)
POTASSIUM SERPL-SCNC: 4.1 MMOL/L (ref 3.5–5.1)
PROT SERPL-MCNC: 5.4 G/DL (ref 6.1–8.1)
RBC # BLD AUTO: 3.96 10^6/UL (ref 4.7–6.1)
SODIUM SERPL-SCNC: 132 MMOL/L (ref 135–144)
WBC # BLD AUTO: 5.8 10^3/UL (ref 4.8–10.8)

## 2017-05-14 RX ADMIN — FAMOTIDINE SCH MG: 20 TABLET ORAL at 20:48

## 2017-05-14 RX ADMIN — ALBUTEROL SULFATE SCH PUFF: 90 AEROSOL, METERED RESPIRATORY (INHALATION) at 02:00

## 2017-05-14 RX ADMIN — ALBUTEROL SULFATE SCH PUFF: 90 AEROSOL, METERED RESPIRATORY (INHALATION) at 13:37

## 2017-05-14 RX ADMIN — ALBUTEROL SULFATE SCH PUFF: 90 AEROSOL, METERED RESPIRATORY (INHALATION) at 08:00

## 2017-05-14 RX ADMIN — ALBUTEROL SULFATE SCH PUFF: 90 AEROSOL, METERED RESPIRATORY (INHALATION) at 20:00

## 2017-05-14 RX ADMIN — FAMOTIDINE SCH MG: 20 TABLET ORAL at 08:26

## 2017-05-14 RX ADMIN — FOLIC ACID SCH MLS/HR: 5 INJECTION, SOLUTION INTRAMUSCULAR; INTRAVENOUS; SUBCUTANEOUS at 00:27

## 2017-05-14 NOTE — PN
Date/Time of Note


Date/Time of Note


DATE: 5/14/17 


TIME: 17:30





Assessment/Plan


VTE Prophylaxis


VTE Prophylaxis Intervention:  ambulation





Lines/Catheters


IV Catheter Type (from [facility]):  Peripheral IV


Urinary Cath still in place:  No





Assessment/Plan


Chief Complaint/Hosp Course


1.  Jaundice with hepatitis


-LFTs have trended down since admission


-Workup for autoimmune hepatitis is pending, hepatitis viral panel is negative


-GI consult is appreciated


-MRCP shows hepatitis and splenomegaly otherwise no significant findings


2.  Joint pain with swollen hands-this is a chronic issue


-Patient will need outpatient follow-up with rheumatologist, autoimmune workup 

is pending


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

stranded DNA


3.  History of asthma-stable





Prophylaxis: Ambulation


Problems:  





Subjective


24 Hr Interval Summary


Constitutional:  no complaints





Exam/Review of Systems


Vital Signs


Vitals





 Vital Signs








  Date Time  Temp Pulse Resp B/P Pulse Ox O2 Delivery O2 Flow Rate FiO2


 


5/14/17 08:08 98.1 82 18 85/50 96   


 


5/13/17 03:00      Room Air  














 Intake and Output   


 


 5/13/17 5/13/17 5/14/17





 15:00 23:00 07:00


 


Intake Total  900 ml 1600 ml


 


Balance  900 ml 1600 ml











Exam


Constitutional:  alert, oriented


Respiratory:  clear to auscultation


Cardiovascular:  regular rate and rhythm


Gastrointestinal:  non-tender, soft, 


   No distended


Musculoskeletal:  nl extremities to inspection





Results


Result Diagram:  


5/14/17 0412                                                                   

             5/14/17 0412





Results 24 hrs





Laboratory Tests








Test


  5/14/17


04:12


 


White Blood Count 5.8  


 


Red Blood Count 3.96  L


 


Hemoglobin 11.3  L


 


Hematocrit 33.3  L


 


Mean Corpuscular Volume 84.1  


 


Mean Corpuscular Hemoglobin 28.5  L


 


Mean Corpuscular Hemoglobin


Concent 33.9  


 


 


Red Cell Distribution Width 15.7  H


 


Platelet Count 166  


 


Mean Platelet Volume 12.2  H


 


Neutrophils % 47.9  


 


Lymphocytes % 30.5  


 


Monocytes % 13.3  H


 


Eosinophils % 7.8  H


 


Basophils % 0.3  


 


Nucleated Red Blood Cells % 0.0  


 


Neutrophils # 2.8  


 


Lymphocytes # 1.8  


 


Monocytes # 0.8  


 


Eosinophils # 0.5  


 


Basophils # 0.0  


 


Nucleated Red Blood Cells # 0.0  


 


Sodium Level 132  L


 


Potassium Level 4.1  


 


Chloride Level 105  


 


Carbon Dioxide Level 26  


 


Anion Gap 5  L


 


Blood Urea Nitrogen 9  


 


Creatinine 0.57  L


 


Glucose Level 81  


 


Calcium Level 7.8  L


 


Magnesium Level 1.5  L


 


Total Bilirubin 4.2  H


 


Direct Bilirubin 3.30  H


 


Indirect Bilirubin 0.9  


 


Aspartate Amino Transf


(AST/SGOT) 572  H


 


 


Alanine Aminotransferase


(ALT/SGPT) 429  H


 


 


Alkaline Phosphatase 176  H


 


Total Protein 5.4  L


 


Albumin 2.0  L


 


Globulin 3.40  H


 


Albumin/Globulin Ratio 0.58  











Medications


Medications





 Current Medications


Ondansetron HCl (Zofran Inj) 4 mg Q6H  PRN IV NAUSEA AND/OR VOMITING;  Start 5/ 12/17 at 22:30


Morphine Sulfate (morphine) 2 mg Q4H  PRN IV SEVERE PAIN LEVEL 7-10;  Start 5/12 /17 at 22:30


Famotidine 20 mg 20 mg Q12 PO  Last administered on 5/14/17at 08:26; Admin Dose 

20 MG;  Start 5/13/17 at 09:00


Magnesium Sulfate (Magnesium Sulfate 4 Gm/100 ml) 100 ml @  25 mls/hr ONCE  

ONCE IVPB  Last administered on 5/14/17at 14:43; Admin Dose 25 MLS/HR;  Start 5/ 14/17 at 14:00;  Stop 5/14/17 at 17:59











ANTONIO ALEMAN May 14, 2017 17:34

## 2017-05-14 NOTE — PN
DATE:  05/14/2017

 

 

SUBJECTIVE:  Clinically, there is no significant change.  The patient still has poor appetite.  

 

LABORATORY DATA:  His LFTs essentially remain unchanged.  Of note is the fact that the patient's CA 
19-9 is 869 and CEA is 7.2.

 

PLAN:  Continue workup and evaluation by GI.

 

There are no new surgical recommendations at this time.  I will sign off and see again p.r.n. at you
r request.

 

 

Dictated By: SOLA BENTON/ELIOT

DD:    05/14/2017 08:44:57

DT:    05/14/2017 08:52:32

Conf#: [id number]

DID#:  [id number]

## 2017-05-14 NOTE — CONS
Date/Time of Note


Date/Time of Note


DATE: 5/14/17 


TIME: 12:07





Assessment/Plan


Assessment/Plan


Chief Complaint/Hosp Course


Mr.Eyasu Sagastume is a 22-year-old male with a history of asthma who presented to 

the emergency room for further evaluation of nausea, vomiting, and abdominal 

pain.  Patient denies hematemesis, fever, chills, diarrhea, hematochezia, sick 

contacts, and recent travel outside of the [place].  Patient reports intermittent 

vomiting with chronic nausea and abdominal pain for the last 2 weeks.  He also 

reports losing 4-6 pounds over this period.  He reports previous history of 

abdominal pain for the last year with 50 pound weight loss. About a year ago he 

used to weigh 185 pounds and he intentionally wanted to lose about 20 pounds, 

but he continued to lose weight because of the vomiting and the decreased p.o. 

intake. Patient also has a history of joint pains and swelling for the last 

year as well.  Patient states last travel to [facility] was a year ago.  Patient 

said he was in the process of getting additional workup for autoimmune causes 

of joint pains.  Patient denies diagnosis of lupus.


Problems:  


Additional Assessment/Plan


Transaminitis/jaundice


Evaluate for pancreatitis versus autoimmune etiology versus transient elevation


Rule out choledocholithiasis 


IVF Hydration


Nausea and pain control


MRCP: 


1.  Hepatomegaly is noted.  Hepatic periportal edema is identified, which can 

be seen in the setting of hepatitis.


2.  Splenomegaly is noted measuring 14 cm.


3.  There is mild upper abdominal ascites.


4.  Circumferential gallbladder wall thickening is noted, likely reactive 

secondary to underlying liver disease.  No evidence of cholelithiasis, 

cholecystitis or biliary dilatation is identified.


5.  There is mild anasarca.   


ERCP if clinically indicated, pt advised of R/B/A of procedure and she provides 

informed consent to proceed


Monitor LFTs, amylase, lipase


Surgery following





Asthma


Management per Primary





Joint pain


Management per Primary


May require rheumatology consult


Autoimmune markers in process








Further recommendations depend on clinical course


Patient seen in collaboration with Dr. Pritchard





Consultation Date/Type/Reason


Admit Date/Time


May 12, 2017 at 21:48


Initial Consult Date





Type of Consultation:  Gastroenterology





24 HR Interval Summary


Free Text/Dictation


Pt tolerating diet


Awaiting autoimmune markers


may need Heme/Onc consult based on CEA and CA 19-9 values





Exam/Review of Systems


Vital Signs


Vitals





 Vital Signs








  Date Time  Temp Pulse Resp B/P Pulse Ox O2 Delivery O2 Flow Rate FiO2


 


5/14/17 08:08 98.1 82 18 85/50 96   


 


5/13/17 03:00      Room Air  














 Intake and Output   


 


 5/13/17 5/13/17 5/14/17





 15:00 23:00 07:00


 


Intake Total  900 ml 1600 ml


 


Balance  900 ml 1600 ml











Exam


Constitutional:  alert, oriented, well developed, thin


Psych:  nl mood/affect


Head:  normocephalic


Eyes:  EOMI, nl conjunctiva, nl lids, anicteric sclera


ENMT:  nl external ears & nose, nl lips & teeth, nl nasal mucosa & septum


Respiratory:  clear to auscultation, normal air movement


Cardiovascular:  regular rate and rhythm


Gastrointestinal:  soft, non-tender


Musculoskeletal:  nl extremities to inspection


Neurological:  CNS II-XII intact





Results


Result Diagram:  


5/14/17 0412                                                                   

             5/14/17 0412





Results 24 hrs





Laboratory Tests








Test


  5/13/17


15:20 5/14/17


04:12


 


Alpha Fetoprotein 2.77   


 


Carcinoembryonic Antigen 7.2  H 


 


White Blood Count  5.8  


 


Red Blood Count  3.96  L


 


Hemoglobin  11.3  L


 


Hematocrit  33.3  L


 


Mean Corpuscular Volume  84.1  


 


Mean Corpuscular Hemoglobin  28.5  L


 


Mean Corpuscular Hemoglobin


Concent 


  33.9  


 


 


Red Cell Distribution Width  15.7  H


 


Platelet Count  166  


 


Mean Platelet Volume  12.2  H


 


Neutrophils %  47.9  


 


Lymphocytes %  30.5  


 


Monocytes %  13.3  H


 


Eosinophils %  7.8  H


 


Basophils %  0.3  


 


Nucleated Red Blood Cells %  0.0  


 


Neutrophils #  2.8  


 


Lymphocytes #  1.8  


 


Monocytes #  0.8  


 


Eosinophils #  0.5  


 


Basophils #  0.0  


 


Nucleated Red Blood Cells #  0.0  


 


Sodium Level  132  L


 


Potassium Level  4.1  


 


Chloride Level  105  


 


Carbon Dioxide Level  26  


 


Anion Gap  5  L


 


Blood Urea Nitrogen  9  


 


Creatinine  0.57  L


 


Glucose Level  81  


 


Calcium Level  7.8  L


 


Magnesium Level  1.5  L


 


Total Bilirubin  4.2  H


 


Direct Bilirubin  3.30  H


 


Indirect Bilirubin  0.9  


 


Aspartate Amino Transf


(AST/SGOT) 


  572  H


 


 


Alanine Aminotransferase


(ALT/SGPT) 


  429  H


 


 


Alkaline Phosphatase  176  H


 


Total Protein  5.4  L


 


Albumin  2.0  L


 


Globulin  3.40  H


 


Albumin/Globulin Ratio  0.58  











Medications


Medications





 Current Medications


Ondansetron HCl (Zofran Inj) 4 mg Q6H  PRN IV NAUSEA AND/OR VOMITING;  Start 5/ 12/17 at 22:30


Morphine Sulfate (morphine) 2 mg Q4H  PRN IV SEVERE PAIN LEVEL 7-10;  Start 5/12 /17 at 22:30


Famotidine (Pepcid) 20 mg Q12 PO  Last administered on 5/14/17at 08:26; Admin 

Dose 20 MG;  Start 5/13/17 at 09:00











LARS CASTRO May 14, 2017 12:17

## 2017-05-15 VITALS — SYSTOLIC BLOOD PRESSURE: 99 MMHG | DIASTOLIC BLOOD PRESSURE: 57 MMHG | RESPIRATION RATE: 18 BRPM

## 2017-05-15 VITALS — RESPIRATION RATE: 18 BRPM | SYSTOLIC BLOOD PRESSURE: 97 MMHG | DIASTOLIC BLOOD PRESSURE: 53 MMHG

## 2017-05-15 LAB
ADD SCAN DIFF: NO
ALBUMIN SERPL-MCNC: 1.9 G/DL (ref 3.3–4.9)
ALBUMIN/GLOB SERPL: 0.55 {RATIO}
ALP SERPL-CCNC: 180 IU/L (ref 42–121)
ALT SERPL-CCNC: 404 IU/L (ref 13–69)
AMYLASE SERPL-CCNC: 80 U/L (ref 11–123)
ANION GAP SERPL CALC-SCNC: 9 MMOL/L (ref 8–16)
AST SERPL-CCNC: 594 IU/L (ref 15–46)
BASOPHILS # BLD AUTO: 0 10^3/UL (ref 0–0.1)
BASOPHILS NFR BLD: 0.4 % (ref 0–2)
BILIRUB DIRECT SERPL-MCNC: 2.6 MG/DL (ref 0–0.2)
BILIRUB SERPL-MCNC: 3.4 MG/DL (ref 0.2–1.3)
BUN SERPL-MCNC: 8 MG/DL (ref 7–20)
CALCIUM SERPL-MCNC: 7.7 MG/DL (ref 8.4–10.2)
CHLORIDE SERPL-SCNC: 103 MMOL/L (ref 97–110)
CHOLEST SERPL-MCNC: 69 MG/DL (ref 100–200)
CHOLEST/HDLC SERPL: 6.2 RATIO
CO2 SERPL-SCNC: 25 MMOL/L (ref 21–31)
CREAT SERPL-MCNC: 0.56 MG/DL (ref 0.61–1.24)
CRP SERPL-MCNC: 1.2 MG/DL (ref 0–0.9)
EOSINOPHIL # BLD: 0.4 10^3/UL (ref 0–0.5)
EOSINOPHIL NFR BLD: 7.4 % (ref 0–7)
ERYTHROCYTE [DISTWIDTH] IN BLOOD BY AUTOMATED COUNT: 15.9 % (ref 11.5–14.5)
GLOBULIN SER-MCNC: 3.4 G/DL (ref 1.3–3.2)
GLUCOSE SERPL-MCNC: 87 MG/DL (ref 70–220)
HCT VFR BLD CALC: 32.3 % (ref 42–52)
HDLC SERPL-MCNC: 11 MG/DL (ref 30–63)
HGB BLD-MCNC: 10.9 G/DL (ref 14–18)
IRON SERPL-MCNC: 103 UG/DL (ref 35–150)
LDH SERPL-CCNC: 732 IU/L (ref 313–618)
LYMPHOCYTES # BLD AUTO: 1.5 10^3/UL (ref 0.8–2.9)
LYMPHOCYTES NFR BLD AUTO: 27.8 % (ref 15–51)
MCH RBC QN AUTO: 28.2 PG (ref 29–33)
MCHC RBC AUTO-ENTMCNC: 33.7 G/DL (ref 32–37)
MCV RBC AUTO: 83.7 FL (ref 82–101)
MONOCYTES # BLD: 0.8 10^3/UL (ref 0.3–0.9)
MONOCYTES NFR BLD: 14.3 % (ref 0–11)
NEUTROPHILS # BLD: 2.6 10^3/UL (ref 1.6–7.5)
NEUTROPHILS NFR BLD AUTO: 49.7 % (ref 39–77)
NRBC # BLD MANUAL: 0 10^3/UL (ref 0–0)
NRBC BLD QL: 0 /100WBC (ref 0–0)
PLATELET # BLD: 176 10^3/UL (ref 140–415)
PMV BLD AUTO: 12.7 FL (ref 7.4–10.4)
POTASSIUM SERPL-SCNC: 3.7 MMOL/L (ref 3.5–5.1)
PROT SERPL-MCNC: 5.3 G/DL (ref 6.1–8.1)
RBC # BLD AUTO: 3.86 10^6/UL (ref 4.7–6.1)
SODIUM SERPL-SCNC: 133 MMOL/L (ref 135–144)
TIBC SERPL-MCNC: 249 UG/DL (ref 241–421)
TRIGL SERPL-MCNC: 157 MG/DL (ref 0–149)
WBC # BLD AUTO: 5.3 10^3/UL (ref 4.8–10.8)

## 2017-05-15 RX ADMIN — FAMOTIDINE SCH MG: 20 TABLET ORAL at 08:09

## 2017-05-15 RX ADMIN — ALBUTEROL SULFATE SCH PUFF: 90 AEROSOL, METERED RESPIRATORY (INHALATION) at 02:00

## 2017-05-15 RX ADMIN — ALBUTEROL SULFATE SCH PUFF: 90 AEROSOL, METERED RESPIRATORY (INHALATION) at 20:00

## 2017-05-15 RX ADMIN — ALBUTEROL SULFATE SCH PUFF: 90 AEROSOL, METERED RESPIRATORY (INHALATION) at 13:02

## 2017-05-15 RX ADMIN — FAMOTIDINE SCH MG: 20 TABLET ORAL at 22:21

## 2017-05-15 RX ADMIN — ALBUTEROL SULFATE SCH PUFF: 90 AEROSOL, METERED RESPIRATORY (INHALATION) at 07:40

## 2017-05-15 NOTE — PN
Date/Time of Note


Date/Time of Note


DATE: 5/15/17 


TIME: 15:08





Assessment/Plan


VTE Prophylaxis


VTE Prophylaxis Intervention:  SCD's





Lines/Catheters


IV Catheter Type (from [facility]):  Saline Lock


Urinary Cath still in place:  No





Assessment/Plan


Assessment/Plan


Assessment


* Jaundice/Transaminitis


               Hepatocellular consider autoimmune hepatitis r/o extrahepatic,r/

o tumors


                  MRI abdomen


                     Hepatomegaly is noted.  Hepatic periportal edema is 

identified, which can be seen in the setting of hepatitis.


                     Splenomegaly is noted measuring 14 cm.


                .  There is mild upper abdominal ascites.


               .  Circumferential gallbladder wall thickening is noted, likely 

reactive secondary to underlying liver disease.  No evidence of cholelithiasis, 

cholecystitis or biliary dilatation is identified.


                  There is mild anasarca.


* Elevated Ca 19-9


* Hx of asthma





PLAN 


* continue present management


* will await results ANCA,.ANSA,REID result





Subjective


24 Hr Interval Summary


Free Text/Dictation


* Course reviewed with RN


* patient seen and examined


* still complains of on and off abdominal pain


* MRI abdomen 5/12/2017


                     Hepatomegaly is noted.  Hepatic periportal edema is 

identified, which can be seen in the setting of hepatitis.


                     Splenomegaly is noted measuring 14 cm.


                  .  There is mild upper abdominal ascites.


                      Circumferential gallbladder wall thickening is noted, 

likely reactive secondary to underlying liver disease.  No evidence of 

cholelithiasis, cholecystitis or biliary dilatation is identified.


                 .  There is mild anasarca.





Exam/Review of Systems


Vital Signs


Vitals





 Vital Signs








  Date Time  Temp Pulse Resp B/P Pulse Ox O2 Delivery O2 Flow Rate FiO2


 


5/15/17 10:23 98.2 72 18 97/53 100   


 


5/13/17 03:00      Room Air  














 Intake and Output   


 


 5/14/17 5/14/17 5/15/17





 15:00 23:00 07:00


 


Intake Total 500 ml 940 ml 800 ml


 


Balance 500 ml 940 ml 800 ml











Exam


Constitutional:  alert, oriented


Eyes:  PERRL, icteric


Neck:  non-tender, supple


Respiratory:  clear to auscultation, normal air movement


Cardiovascular:  nl pulses, regular rate and rhythm


Gastrointestinal:  bowel sounds, nl liver, spleen, non-tender, soft


Musculoskeletal:  nl extremities to inspection, nl gait and stance


Extremities:  normal pulses


Neurological:  nl speech


Skin:  nl turgor, 


   No rash or lesions





Results


Result Diagram:  


5/15/17 0410                                                                   

             5/15/17 0410





Results 24 hrs





Laboratory Tests








Test


  5/15/17


04:10


 


White Blood Count 5.3  


 


Red Blood Count 3.86  L


 


Hemoglobin 10.9  L


 


Hematocrit 32.3  L


 


Mean Corpuscular Volume 83.7  


 


Mean Corpuscular Hemoglobin 28.2  L


 


Mean Corpuscular Hemoglobin


Concent 33.7  


 


 


Red Cell Distribution Width 15.9  H


 


Platelet Count 176  


 


Mean Platelet Volume 12.7  H


 


Neutrophils % 49.7  


 


Lymphocytes % 27.8  


 


Monocytes % 14.3  H


 


Eosinophils % 7.4  H


 


Basophils % 0.4  


 


Nucleated Red Blood Cells % 0.0  


 


Neutrophils # 2.6  


 


Lymphocytes # 1.5  


 


Monocytes # 0.8  


 


Eosinophils # 0.4  


 


Basophils # 0.0  


 


Nucleated Red Blood Cells # 0.0  


 


Erythrocyte Sedimentation Rate 3  


 


Sodium Level 133  L


 


Potassium Level 3.7  


 


Chloride Level 103  


 


Carbon Dioxide Level 25  


 


Anion Gap 9  


 


Blood Urea Nitrogen 8  


 


Creatinine 0.56  L


 


Glucose Level 87  


 


Calcium Level 7.7  L


 


Magnesium Level 2.0  


 


Ferritin 327.0  


 


Total Bilirubin 3.4  H


 


Direct Bilirubin 2.60  H


 


Indirect Bilirubin 0.8  


 


Aspartate Amino Transf


(AST/SGOT) 594  H


 


 


Alanine Aminotransferase


(ALT/SGPT) 404  H


 


 


Alkaline Phosphatase 180  H


 


Lactate Dehydrogenase 732  H


 


C-Reactive Protein 1.2  H


 


Total Protein 5.3  L


 


Albumin 1.9  L


 


Globulin 3.40  H


 


Albumin/Globulin Ratio 0.55  


 


Triglycerides Level 157  H


 


Cholesterol Level 69  L


 


LDL Cholesterol, Calculated 27  


 


HDL Cholesterol 11  L


 


Cholesterol/HDL Ratio 6.2  


 


Amylase Level 80  


 


Lipase 214  











Medications


Medications





 Current Medications


Ondansetron HCl (Zofran Inj) 4 mg Q6H  PRN IV NAUSEA AND/OR VOMITING;  Start 5/ 12/17 at 22:30


Morphine Sulfate (morphine) 2 mg Q4H  PRN IV SEVERE PAIN LEVEL 7-10;  Start 5/12 /17 at 22:30


Famotidine (Pepcid) 20 mg Q12 PO  Last administered on 5/15/17at 08:09; Admin 

Dose 20 MG;  Start 5/13/17 at 09:00











BRET SWAIN MD May 15, 2017 15:17

## 2017-05-15 NOTE — CONS
Date/Time of Note


Date/Time of Note


DATE: 5/15/17 


TIME: 13:01





Assessment/Plan


Assessment/Plan


Chief Complaint/Hosp Course


The patient is a 22 year old male with hyperbilirubinemia, trending down since 

admission, of unclear etiology with negative hepatitis panel, undergoing work-

up for autoimmune hepatitis, with significantly elevated CA 19-9 at 869 (0-37) 

and CEA mildly elevated at 7.2 (0-5).  AFP normal at 2.77 (0-7.21).





- MRCP 5/13/17 demonstrated hepatomegaly at 20 cm with no focal hepatic mass is 

identified, hepatic periportal edema is noted, which can be seen in the setting 

of hepatitis.  Gallbladder is contracted, and demonstrates circumferential wall 

edema, likely reactive secondary to underlying liver disease.  Splenomegaly is 

noted measuring 14 cm.  Biliary tree, pancreas, adrenal glands and kidneys are 

unremarkable.  There is no retroperitoneal or lacy hepatis lymphadenopathy.  

Mild upper abdominal ascites is noted.  


- Appreciate GI recs.  Would agree with ERCP.


- If ERCP negative, consider EUS to rule out pancreaticobiliary lesion. CA 19-9 

is significantly elevated and may be due to underlying inflammatory/autoimmune 

process, however would need to rule out underlying pancreaticobiliary 

malignancy such as pancreatic or intrahepatic cholangiocarcinoma.  Patient can 

follow-up with us as an outpatient for EUS/GI referral.





# Normocytic anemia, will check iron panel, ferritin, B12/folate, MMA, 

homocystine, TSH, LDH, haptoglobin, retic count.  Pending REID.


Problems:  





Consultation Date/Type/Reason


Admit Date/Time


May 12, 2017 at 21:48


Date of Consultation:  May 15, 2017


Type of Consultation:  Oncology


Reason for Consultation


Elevated CA 19-9, CEA





Hx of Present Illness


The patient is 22-year-old [place] male with a history of asthma who 

presented to the emergency department with abdominal pain, vomiting, jaundice 

and swollen hands.  He stated that he has had the RUQ abdominal pain, N/V and 

jaundice for 2.5 weeks.  He has had some constipation and pale stool but no 

diarrhea.  No F/C.  He states that the abdominal pain comes and goes and that 

eating makes his nausea worse.  He lost 6 pounds in the past 2 weeks and 50 

pounds in the past year, unintentional.  





He was actually referred to Dr. Delcid, the gastroenterologist, who actually 

already saw the patient.  The patient further stated that he has been well up 

until about a year ago when he started noticing that both of his hands were 

swollen.  At that time, he also started having joint pains and generalized 

weakness.  He did see a doctor who told him just to exercise his hands.  Until 

this day, his hands are somehow swollen and he cannot make a fist because of 

pain and also there is some limitation.  He did travel back to [facility] for a 

visit about a year ago. 


 


He had a right upper quadrant ultrasound which shows possible trace amount of 

right perinephric fluid, enlarged and a prominent pancreas.  His gallbladder 

was contracted causing limited visualization, but there was no evidence of 

gallstones.  CT abdomen and pelvis with contrast was done which showed diffuse 

gallbladder wall edema.  Nonspecific mild periportal edema.  A small amount of 

ascites.  The findings could be secondary to hepatitis or hepatic congestion.  

MRCP demonstrated hepatomegaly without focal hepatic mass and splenomegaly.


Musculoskeletal:  bone/joint pain





Past Medical History


Polyarthritis with decreased range of motion, Asthma, low Vitamin D level





Past Surgical History


Past Surgical Hx:  no surgical history





Family History


Significant Family History:  no pertinent family hx





Social History


Alcohol Use:  none


Smoking Status:  Never smoker





Exam/Review of Systems


Vital Signs


Vitals





 Vital Signs








  Date Time  Temp Pulse Resp B/P Pulse Ox O2 Delivery O2 Flow Rate FiO2


 


5/15/17 10:23 98.2 72 18 97/53 100   


 


5/13/17 03:00      Room Air  














 Intake and Output   


 


 5/14/17 5/14/17 5/15/17





 14:59 22:59 06:59


 


Intake Total 500 ml 940 ml 800 ml


 


Balance 500 ml 940 ml 800 ml











Exam


Constitutional:  alert, oriented


Psych:  no complaints


Eyes:  icteric


Neck:  supple


Respiratory:  clear to auscultation


Cardiovascular:  regular rate and rhythm


Gastrointestinal:  non-tender, soft


Musculoskeletal:  nl extremities to inspection


Neurological:  CNS II-XII intact





Results


Result Diagram:  


5/15/17 0410                                                                   

             5/15/17 0410





Results 24 hrs





Laboratory Tests








Test


  5/15/17


04:10


 


White Blood Count 5.3  


 


Red Blood Count 3.86  L


 


Hemoglobin 10.9  L


 


Hematocrit 32.3  L


 


Mean Corpuscular Volume 83.7  


 


Mean Corpuscular Hemoglobin 28.2  L


 


Mean Corpuscular Hemoglobin


Concent 33.7  


 


 


Red Cell Distribution Width 15.9  H


 


Platelet Count 176  


 


Mean Platelet Volume 12.7  H


 


Neutrophils % 49.7  


 


Lymphocytes % 27.8  


 


Monocytes % 14.3  H


 


Eosinophils % 7.4  H


 


Basophils % 0.4  


 


Nucleated Red Blood Cells % 0.0  


 


Neutrophils # 2.6  


 


Lymphocytes # 1.5  


 


Monocytes # 0.8  


 


Eosinophils # 0.4  


 


Basophils # 0.0  


 


Nucleated Red Blood Cells # 0.0  


 


Erythrocyte Sedimentation Rate 3  


 


Sodium Level 133  L


 


Potassium Level 3.7  


 


Chloride Level 103  


 


Carbon Dioxide Level 25  


 


Anion Gap 9  


 


Blood Urea Nitrogen 8  


 


Creatinine 0.56  L


 


Glucose Level 87  


 


Calcium Level 7.7  L


 


Magnesium Level 2.0  


 


Total Bilirubin 3.4  H


 


Direct Bilirubin 2.60  H


 


Indirect Bilirubin 0.8  


 


Aspartate Amino Transf


(AST/SGOT) 594  H


 


 


Alanine Aminotransferase


(ALT/SGPT) 404  H


 


 


Alkaline Phosphatase 180  H


 


Lactate Dehydrogenase 732  H


 


C-Reactive Protein 1.2  H


 


Total Protein 5.3  L


 


Albumin 1.9  L


 


Globulin 3.40  H


 


Albumin/Globulin Ratio 0.55  


 


Triglycerides Level 157  H


 


Cholesterol Level 69  L


 


LDL Cholesterol, Calculated 27  


 


HDL Cholesterol 11  L


 


Cholesterol/HDL Ratio 6.2  


 


Amylase Level 80  


 


Lipase 214  











Medications


Medications





 Current Medications


Ondansetron HCl (Zofran Inj) 4 mg Q6H  PRN IV NAUSEA AND/OR VOMITING;  Start 5/ 12/17 at 22:30


Morphine Sulfate (morphine) 2 mg Q4H  PRN IV SEVERE PAIN LEVEL 7-10;  Start 5/12 /17 at 22:30


Famotidine (Pepcid) 20 mg Q12 PO  Last administered on 5/15/17at 08:09; Admin 

Dose 20 MG;  Start 5/13/17 at 09:00











ALCIDES LOONEY MD May 15, 2017 13:12

## 2017-05-15 NOTE — PN
DATE:  05/15/2017

 

 

TIME OF EVALUATION:  11:30 a.m.

 

SUBJECTIVE DATA:  Complains of some nausea.

 

OBJECTIVE DATA:

VITAL SIGNS:  Temperature 98.2, pulse rate 72, respiratory rate 18, blood 
pressure 97/53 and oxygen saturation 100% on room air.

GENERAL:  This is a 22-year-old male patient lying in bed in no apparent 
distress.

HEENT:  Head normocephalic and atraumatic.  Eyes icteric sclerae.  Conjunctivae 
clear.

ENT:  Nasal septum is midline.  Oral mucosa is moist.

NECK:  Supple.  No JVD noticed.

RESPIRATORY:  Bilaterally clear to auscultation.  No adventitious breath sounds 
heard.  No use of accessory muscles of respiration.

CARDIAC:  Regular rate and rhythm.

ABDOMEN:  Soft, nontender, nondistended.  Bowel sounds positive in all 4 
quadrants.

GENITOURINARY:  Deferred.

EXTREMITIES:  No cyanosis, no clubbing, no edema.  Peripheral pulses palpable.  
Tenderness in the joints.

NEUROLOGIC:  The patient is awake, alert and oriented.  Cranial nerves are 
grossly intact.

 

LABORATORY AND DIAGNOSTIC DATA:  WBC 5.3, hemoglobin 10.9, hematocrit 32.5, 
platelet count 176.  Sodium 133, potassium 3.7, chloride 100, carbon dioxide 20
, anion gap 9, BUN 8, creatinine 0.56, glucose 87, calcium 7.7, total bilirubin 
3.4, direct bilirubin 2.6, indirect bilirubin 0.8, AST of 594, , 
alkaline phosphatase 180. Total protein 5.3, albumin 1.9.

 

ASSESSMENT AND PLAN:

1.  Hyperbilirubinemia with transaminitis, etiology unclear.  Hepatitis panel 
negative.  The patient is being evaluated for any autoimmune hepatitis.



2.  Positive tumor markers.  The patient has positive CA 19-9 and CEA.  However
, the patient's alpha fetoprotein is negative.  We will involve oncology on the 
case.



3.  Polyarthritis with decreased range of motion.  Etiology unclear.  We will 
obtain an ESR and C-reactive protein on this patient.  We will await further 
workup including REID on this patient.  



4.  Asthma.  Stable.  Continue p.r.n. inhaled bronchodilators.



5.  Normocytic anemia.  Etiology unclear.  We will monitor the H and H closely.
  We will order an iron panel.



6.  Fluid, electrolytes and nutrition.  Regular diet as tolerated.



7.  Deep venous thrombosis prophylaxis.  Ambulation.



8.  Gastrointestinal prophylaxis.  Histamine 2 receptor blockers.

 

PLAN:  Continue inpatient monitoring.  Await blood work including small muscle 
antibody.  Obtain oncology evaluation.

 

Case discussed with Dr. Finch.

 

The plan of care was explained to the patient's family who was at the bedside.

 

 

SOBEIDA FINCH MD, AM/ELIOT

DD:    05/15/2017 11:48:12

DT:    05/15/2017 12:34:55

Conf#: [id number]

DID#:  [id number]

 

MTDD

## 2017-05-16 VITALS — RESPIRATION RATE: 16 BRPM | SYSTOLIC BLOOD PRESSURE: 82 MMHG | DIASTOLIC BLOOD PRESSURE: 50 MMHG

## 2017-05-16 VITALS — DIASTOLIC BLOOD PRESSURE: 50 MMHG | RESPIRATION RATE: 20 BRPM | SYSTOLIC BLOOD PRESSURE: 93 MMHG

## 2017-05-16 LAB
ADD SCAN DIFF: NO
ALBUMIN SERPL-MCNC: 1.8 G/DL (ref 3.3–4.9)
ALBUMIN SERPL-MCNC: 1.9 G/DL (ref 3.3–4.9)
ALBUMIN/GLOB SERPL: 0.55 {RATIO}
ALP SERPL-CCNC: 183 IU/L (ref 42–121)
ALP SERPL-CCNC: 195 IU/L (ref 42–121)
ALT SERPL-CCNC: 417 IU/L (ref 13–69)
ALT SERPL-CCNC: 422 IU/L (ref 13–69)
AMYLASE SERPL-CCNC: 60 U/L (ref 11–123)
ANA SER QL: NEGATIVE
ANION GAP SERPL CALC-SCNC: 10 MMOL/L (ref 8–16)
AST SERPL-CCNC: 609 IU/L (ref 15–46)
AST SERPL-CCNC: 617 IU/L (ref 15–46)
BASOPHILS # BLD AUTO: 0 10^3/UL (ref 0–0.1)
BASOPHILS NFR BLD: 0.4 % (ref 0–2)
BILIRUB DIRECT SERPL-MCNC: 2 MG/DL (ref 0–0.2)
BILIRUB DIRECT SERPL-MCNC: 2.1 MG/DL (ref 0–0.2)
BILIRUB SERPL-MCNC: 2.9 MG/DL (ref 0.2–1.3)
BILIRUB SERPL-MCNC: 3 MG/DL (ref 0.2–1.3)
BUN SERPL-MCNC: 7 MG/DL (ref 7–20)
CALCIUM SERPL-MCNC: 7.7 MG/DL (ref 8.4–10.2)
CHLORIDE SERPL-SCNC: 101 MMOL/L (ref 97–110)
CO2 SERPL-SCNC: 27 MMOL/L (ref 21–31)
CREAT SERPL-MCNC: 0.5 MG/DL (ref 0.61–1.24)
EOSINOPHIL # BLD: 0.4 10^3/UL (ref 0–0.5)
EOSINOPHIL NFR BLD: 8.5 % (ref 0–7)
ERYTHROCYTE [DISTWIDTH] IN BLOOD BY AUTOMATED COUNT: 16 % (ref 11.5–14.5)
FERRITIN SERPL-MCNC: 354 NG/ML (ref 17.9–464)
FOLATE SERPL-MCNC: 7.2 NG/ML (ref 2.8–20)
GLOBULIN SER-MCNC: 3.4 G/DL (ref 1.3–3.2)
GLUCOSE SERPL-MCNC: 80 MG/DL (ref 70–220)
HCT VFR BLD CALC: 33 % (ref 42–52)
HGB BLD-MCNC: 11.2 G/DL (ref 14–18)
IRON SERPL-MCNC: 162 UG/DL (ref 35–150)
LDH SERPL-CCNC: 663 IU/L (ref 313–618)
LYMPHOCYTES # BLD AUTO: 1.6 10^3/UL (ref 0.8–2.9)
LYMPHOCYTES NFR BLD AUTO: 33.6 % (ref 15–51)
MCH RBC QN AUTO: 28.6 PG (ref 29–33)
MCHC RBC AUTO-ENTMCNC: 33.9 G/DL (ref 32–37)
MCV RBC AUTO: 84.2 FL (ref 82–101)
MITOCHONDRIAL TB: NEGATIVE
MONOCYTES # BLD: 0.7 10^3/UL (ref 0.3–0.9)
MONOCYTES NFR BLD: 14.3 % (ref 0–11)
NEUTROPHILS # BLD: 2.1 10^3/UL (ref 1.6–7.5)
NEUTROPHILS NFR BLD AUTO: 43 % (ref 39–77)
NRBC # BLD MANUAL: 0 10^3/UL (ref 0–0)
NRBC BLD QL: 0 /100WBC (ref 0–0)
PLATELET # BLD: 162 10^3/UL (ref 140–415)
PMV BLD AUTO: 12.9 FL (ref 7.4–10.4)
POTASSIUM SERPL-SCNC: 3.7 MMOL/L (ref 3.5–5.1)
PROT SERPL-MCNC: 5.1 G/DL (ref 6.1–8.1)
PROT SERPL-MCNC: 5.3 G/DL (ref 6.1–8.1)
RBC # BLD AUTO: 3.92 10^6/UL (ref 4.7–6.1)
RETICS/RBC NFR: 1.1 % (ref 0.5–1.5)
SODIUM SERPL-SCNC: 134 MMOL/L (ref 135–144)
TIBC SERPL-MCNC: 240 UG/DL (ref 241–421)
TSH SERPL-ACNC: 2.24 MIU/L (ref 0.47–4.68)
VIT B12 SERPL-MCNC: 815 PG/ML (ref 239–931)
WBC # BLD AUTO: 4.8 10^3/UL (ref 4.8–10.8)

## 2017-05-16 RX ADMIN — ALBUTEROL SULFATE SCH PUFF: 90 AEROSOL, METERED RESPIRATORY (INHALATION) at 02:00

## 2017-05-16 RX ADMIN — FAMOTIDINE SCH MG: 20 TABLET ORAL at 08:08

## 2017-05-16 RX ADMIN — ALBUTEROL SULFATE SCH PUFF: 90 AEROSOL, METERED RESPIRATORY (INHALATION) at 07:39

## 2017-05-16 RX ADMIN — ALBUTEROL SULFATE SCH PUFF: 90 AEROSOL, METERED RESPIRATORY (INHALATION) at 20:00

## 2017-05-16 RX ADMIN — FAMOTIDINE SCH MG: 20 TABLET ORAL at 20:21

## 2017-05-16 RX ADMIN — ALBUTEROL SULFATE SCH PUFF: 90 AEROSOL, METERED RESPIRATORY (INHALATION) at 13:23

## 2017-05-16 NOTE — PN
Date/Time of Note


Date/Time of Note


DATE: 5/16/17 


TIME: 14:41





Assessment/Plan


VTE Prophylaxis


VTE Prophylaxis Intervention:  SCD's





Lines/Catheters


IV Catheter Type (from [facility]):  Saline Lock


Urinary Cath still in place:  No





Assessment/Plan


Assessment/Plan


Jaundice/Transaminitis


               Hepatocellular consider autoimmune hepatitis r/o extrahepatic,r/

o tumors


                  MRI abdomen


                     Hepatomegaly is noted.  Hepatic periportal edema is 

identified, which can be seen in the setting of hepatitis.


                     Splenomegaly is noted measuring 14 cm.


                .  There is mild upper abdominal ascites.


               .  Circumferential gallbladder wall thickening is noted, likely 

reactive secondary to underlying liver disease.  No evidence of cholelithiasis, 

cholecystitis or biliary dilatation is identified.


                  There is mild anasarca.


* Elevated Ca 19-9


* Hx of asthma





PLAN 


* continue present management


* ERCP risk and benefit discuss with patient agreed with the planned procedure





Subjective


24 Hr Interval Summary


Free Text/Dictation


* Course reviewed with RN


* Patient seen and examined


* REID,SMA,REID negative





Exam/Review of Systems


Vital Signs


Vitals





 Vital Signs








  Date Time  Temp Pulse Resp B/P Pulse Ox O2 Delivery O2 Flow Rate FiO2


 


5/16/17 08:12 98.0 77 16 82/50 100   


 


5/13/17 03:00      Room Air  














 Intake and Output   


 


 5/15/17 5/15/17 5/16/17





 15:00 23:00 07:00


 


Intake Total  840 ml 400 ml


 


Balance  840 ml 400 ml











Exam


Constitutional:  alert, oriented


Psych:  no complaints


Eyes:  icteric


Neck:  non-tender, supple


Respiratory:  clear to auscultation, normal air movement


Cardiovascular:  nl pulses, regular rate and rhythm


Gastrointestinal:  non-tender, soft


Musculoskeletal:  nl extremities to inspection


Extremities:  normal pulses


Neurological:  nl speech, nl strength


Skin:  nl turgor, 


   No rash or lesions





Results


Result Diagram:  


5/16/17 0446                                                                   

             5/16/17 0446





Results 24 hrs





Laboratory Tests








Test


  5/16/17


04:46


 


White Blood Count 4.8  


 


Red Blood Count 3.92  L


 


Hemoglobin 11.2  L


 


Hematocrit 33.0  L


 


Mean Corpuscular Volume 84.2  


 


Mean Corpuscular Hemoglobin 28.6  L


 


Mean Corpuscular Hemoglobin


Concent 33.9  


 


 


Red Cell Distribution Width 16.0  H


 


Platelet Count 162  


 


Mean Platelet Volume 12.9  H


 


Neutrophils % 43.0  


 


Lymphocytes % 33.6  


 


Monocytes % 14.3  H


 


Eosinophils % 8.5  H


 


Basophils % 0.4  


 


Nucleated Red Blood Cells % 0.0  


 


Neutrophils # 2.1  


 


Lymphocytes # 1.6  


 


Monocytes # 0.7  


 


Eosinophils # 0.4  


 


Basophils # 0.0  


 


Nucleated Red Blood Cells # 0.0  


 


Absolute Reticulocyte Count 0.046  


 


Percent Reticulocyte Count 1.1  


 


Sodium Level 134  L


 


Potassium Level 3.7  


 


Chloride Level 101  


 


Carbon Dioxide Level 27  


 


Anion Gap 10  


 


Blood Urea Nitrogen 7  


 


Creatinine 0.50  L


 


Glucose Level 80  


 


Calcium Level 7.7  L


 


Magnesium Level 1.6  L


 


Iron Level 162  #H


 


Total Iron Binding Capacity 240  L


 


Percent Iron Saturation 68  H


 


Ferritin 354.0  


 


Total Bilirubin 2.9  H


 


Direct Bilirubin 2.00  H


 


Indirect Bilirubin 0.9  


 


Aspartate Amino Transf


(AST/SGOT) 609  H


 


 


Alanine Aminotransferase


(ALT/SGPT) 417  H


 


 


Alkaline Phosphatase 183  H


 


Lactate Dehydrogenase 663  H


 


Total Protein 5.1  L


 


Albumin 1.8  L


 


Globulin 3.40  H


 


Albumin/Globulin Ratio 0.55  


 


Amylase Level 60  


 


Lipase 150  


 


Vitamin B12 Level 815  


 


Folate 7.2  


 


Thyroid Stimulating Hormone


(TSH) 2.240  


 











Medications


Medications





 Current Medications


Ondansetron HCl (Zofran Inj) 4 mg Q6H  PRN IV NAUSEA AND/OR VOMITING;  Start 5/ 12/17 at 22:30


Morphine Sulfate (morphine) 2 mg Q4H  PRN IV SEVERE PAIN LEVEL 7-10;  Start 5/12 /17 at 22:30


Famotidine (Pepcid) 20 mg Q12 PO  Last administered on 5/16/17at 08:08; Admin 

Dose 20 MG;  Start 5/13/17 at 09:00











BRET SWAIN MD May 16, 2017 14:44

## 2017-05-16 NOTE — PN
Date/Time of Note


Date/Time of Note


DATE: 5/16/17 


TIME: 14:06





Assessment/Plan


VTE Prophylaxis


VTE Prophylaxis Intervention:  SCD's





Lines/Catheters


IV Catheter Type (from [facility]):  Saline Lock


Urinary Cath still in place:  No





Assessment/Plan


Chief Complaint/Hosp Course


ASSESSMENT AND PLAN:


1.  Hyperbilirubinemia with transaminitis, possibly autoimmune hepatitis.  

Hepatitis panel negative.  The patient is being evaluated for an autoimmune 

hepatitis.


2.  Positive tumor markers.  The patient has positive CA 19-9 and CEA.  Also, 

the patient's alpha fetoprotein is negative.  We will involve oncology on the 

case.


3.  Polyarthritis with decreased range of motion.  Etiology unclear.  We will 

obtain an ESR and C-reactive protein on this patient.  We will await further 

workup including REID on this patient.  The patient ideally needs rheumatology 

evaluation.


4.  Asthma.  Stable.  Continue p.r.n. inhaled bronchodilators.


5.  Normocytic anemia.  Etiology unclear.  We will monitor the H and H closely.

  We will order an iron panel.


 Deep venous thrombosis prophylaxis.  Ambulation.


 Gastrointestinal prophylaxis.  Histamine 2 receptor blockers.


 


PLAN:  Continue inpatient monitoring.  Await blood work including small muscle 

antibody.  Follow-up oncology and gastroenterologist recommendations.


Problems:  





Subjective


24 Hr Interval Summary


Free Text/Dictation


Patient denies of any chest pain or shortness of breath


Denies of any abdominal discomfort


No nausea vomiting diarrhea





Exam/Review of Systems


Vital Signs


Vitals





 Vital Signs








  Date Time  Temp Pulse Resp B/P Pulse Ox O2 Delivery O2 Flow Rate FiO2


 


5/16/17 08:12 98.0 77 16 82/50 100   


 


5/13/17 03:00      Room Air  














 Intake and Output   


 


 5/15/17 5/15/17 5/16/17





 15:00 23:00 07:00


 


Intake Total  840 ml 400 ml


 


Balance  840 ml 400 ml











Exam


General: The patient is well-developed, Not  in acute distress.


HEENT: Conjunctivae is icteric, normocephalic. The pupils are equal and round .


 Neck: Supple with full range of motion. 


Chest: Normal expansion of the thorax during inspiration


Lungs: Clear to auscultation bilaterally


Heart: Normal S1-S2, Regular rhythm and rate.


Abdomen: Soft , nontender,  nondistended , bowel sounds are present.


Extremities: Normal to inspection, no edema no cyanosis


Neurologic: Normal mental status,The patient is awake,  alert and oriented .





Results


Result Diagram:  


5/16/17 0446                                                                   

             5/16/17 0446





Results 24 hrs





Laboratory Tests








Test


  5/16/17


04:46


 


White Blood Count 4.8  


 


Red Blood Count 3.92  L


 


Hemoglobin 11.2  L


 


Hematocrit 33.0  L


 


Mean Corpuscular Volume 84.2  


 


Mean Corpuscular Hemoglobin 28.6  L


 


Mean Corpuscular Hemoglobin


Concent 33.9  


 


 


Red Cell Distribution Width 16.0  H


 


Platelet Count 162  


 


Mean Platelet Volume 12.9  H


 


Neutrophils % 43.0  


 


Lymphocytes % 33.6  


 


Monocytes % 14.3  H


 


Eosinophils % 8.5  H


 


Basophils % 0.4  


 


Nucleated Red Blood Cells % 0.0  


 


Neutrophils # 2.1  


 


Lymphocytes # 1.6  


 


Monocytes # 0.7  


 


Eosinophils # 0.4  


 


Basophils # 0.0  


 


Nucleated Red Blood Cells # 0.0  


 


Absolute Reticulocyte Count 0.046  


 


Percent Reticulocyte Count 1.1  


 


Sodium Level 134  L


 


Potassium Level 3.7  


 


Chloride Level 101  


 


Carbon Dioxide Level 27  


 


Anion Gap 10  


 


Blood Urea Nitrogen 7  


 


Creatinine 0.50  L


 


Glucose Level 80  


 


Calcium Level 7.7  L


 


Magnesium Level 1.6  L


 


Iron Level 162  #H


 


Total Iron Binding Capacity 240  L


 


Percent Iron Saturation 68  H


 


Ferritin 354.0  


 


Total Bilirubin 2.9  H


 


Direct Bilirubin 2.00  H


 


Indirect Bilirubin 0.9  


 


Aspartate Amino Transf


(AST/SGOT) 609  H


 


 


Alanine Aminotransferase


(ALT/SGPT) 417  H


 


 


Alkaline Phosphatase 183  H


 


Lactate Dehydrogenase 663  H


 


Total Protein 5.1  L


 


Albumin 1.8  L


 


Globulin 3.40  H


 


Albumin/Globulin Ratio 0.55  


 


Amylase Level 60  


 


Lipase 150  


 


Vitamin B12 Level 815  


 


Folate 7.2  


 


Thyroid Stimulating Hormone


(TSH) 2.240  


 











Medications


Medications





 Current Medications


Ondansetron HCl (Zofran Inj) 4 mg Q6H  PRN IV NAUSEA AND/OR VOMITING;  Start 5/ 12/17 at 22:30


Morphine Sulfate (morphine) 2 mg Q4H  PRN IV SEVERE PAIN LEVEL 7-10;  Start 5/12 /17 at 22:30


Famotidine (Pepcid) 20 mg Q12 PO  Last administered on 5/16/17at 08:08; Admin 

Dose 20 MG;  Start 5/13/17 at 09:00











CHARLES ZAMORA MD May 16, 2017 14:08

## 2017-05-16 NOTE — CONS
Date/Time of Note


Date/Time of Note


DATE: 5/16/17 


TIME: 09:48





Assessment/Plan


Assessment/Plan


Chief Complaint/Hosp Course


The patient is a 22 year old male with hyperbilirubinemia, trending down since 

admission, of unclear etiology with negative hepatitis panel, undergoing work-

up for autoimmune hepatitis, with significantly elevated CA 19-9 at 869 (0-37) 

and CEA mildly elevated at 7.2 (0-5).  AFP normal at 2.77 (0-7.21).





- MRCP 5/13/17 demonstrated hepatomegaly at 20 cm with no focal hepatic mass is 

identified, hepatic periportal edema is noted, which can be seen in the setting 

of hepatitis.  Gallbladder is contracted, and demonstrates circumferential wall 

edema, likely reactive secondary to underlying liver disease.  Splenomegaly is 

noted measuring 14 cm.  Biliary tree, pancreas, adrenal glands and kidneys are 

unremarkable.  There is no retroperitoneal or lacy hepatis lymphadenopathy.  

Mild upper abdominal ascites is noted.  


- Appreciate GI recs.  Would agree with ERCP.


- If ERCP negative, consider EUS to rule out pancreaticobiliary lesion. CA 19-9 

is significantly elevated and may be due to underlying inflammatory/autoimmune 

process, however would need to rule out underlying pancreaticobiliary 

malignancy such as pancreatic or intrahepatic cholangiocarcinoma.  Patient can 

follow-up with us as an outpatient for EUS/GI referral.





# Normocytic anemia, iron panel consistent with anemia of chronic inflammation, 

B12/folate/TSH WNL, retic count inappropriately low, LDH elevated, haptoglobin 

pending; pending MMA, homocysteine.  Pending REID.


Problems:  





Consultation Date/Type/Reason


Admit Date/Time


May 12, 2017 at 21:48


Initial Consult Date


5/15/17


Type of Consultation:  Oncology





24 HR Interval Summary


Free Text/Dictation


No complaints, states pain is better.





Exam/Review of Systems


Vital Signs


Vitals





 Vital Signs








  Date Time  Temp Pulse Resp B/P Pulse Ox O2 Delivery O2 Flow Rate FiO2


 


5/16/17 08:12 98.0 77 16 82/50 100   


 


5/13/17 03:00      Room Air  














 Intake and Output   


 


 5/15/17 5/15/17 5/16/17





 15:00 23:00 07:00


 


Intake Total  840 ml 400 ml


 


Balance  840 ml 400 ml











Exam


Constitutional:  alert, oriented


Psych:  no complaints


Eyes:  icteric


Neck:  supple


Respiratory:  clear to auscultation


Cardiovascular:  regular rate and rhythm


Gastrointestinal:  non-tender, soft


Musculoskeletal:  nl extremities to inspection


Neurological:  CNS II-XII intact





Results


Result Diagram:  


5/16/17 0446                                                                   

             5/16/17 0446





Results 24 hrs





Laboratory Tests








Test


  5/16/17


04:46


 


White Blood Count 4.8  


 


Red Blood Count 3.92  L


 


Hemoglobin 11.2  L


 


Hematocrit 33.0  L


 


Mean Corpuscular Volume 84.2  


 


Mean Corpuscular Hemoglobin 28.6  L


 


Mean Corpuscular Hemoglobin


Concent 33.9  


 


 


Red Cell Distribution Width 16.0  H


 


Platelet Count 162  


 


Mean Platelet Volume 12.9  H


 


Neutrophils % 43.0  


 


Lymphocytes % 33.6  


 


Monocytes % 14.3  H


 


Eosinophils % 8.5  H


 


Basophils % 0.4  


 


Nucleated Red Blood Cells % 0.0  


 


Neutrophils # 2.1  


 


Lymphocytes # 1.6  


 


Monocytes # 0.7  


 


Eosinophils # 0.4  


 


Basophils # 0.0  


 


Nucleated Red Blood Cells # 0.0  


 


Absolute Reticulocyte Count 0.046  


 


Percent Reticulocyte Count 1.1  


 


Sodium Level 134  L


 


Potassium Level 3.7  


 


Chloride Level 101  


 


Carbon Dioxide Level 27  


 


Anion Gap 10  


 


Blood Urea Nitrogen 7  


 


Creatinine 0.50  L


 


Glucose Level 80  


 


Calcium Level 7.7  L


 


Magnesium Level 1.6  L


 


Iron Level 162  #H


 


Total Iron Binding Capacity 240  L


 


Percent Iron Saturation 68  H


 


Ferritin 354.0  


 


Total Bilirubin 2.9  H


 


Direct Bilirubin 2.00  H


 


Indirect Bilirubin 0.9  


 


Aspartate Amino Transf


(AST/SGOT) 609  H


 


 


Alanine Aminotransferase


(ALT/SGPT) 417  H


 


 


Alkaline Phosphatase 183  H


 


Lactate Dehydrogenase 663  H


 


Total Protein 5.1  L


 


Albumin 1.8  L


 


Globulin 3.40  H


 


Albumin/Globulin Ratio 0.55  


 


Amylase Level 60  


 


Lipase 150  


 


Vitamin B12 Level 815  


 


Folate 7.2  


 


Thyroid Stimulating Hormone


(TSH) 2.240  


 











Medications


Medications





 Current Medications


Ondansetron HCl (Zofran Inj) 4 mg Q6H  PRN IV NAUSEA AND/OR VOMITING;  Start 5/ 12/17 at 22:30


Morphine Sulfate (morphine) 2 mg Q4H  PRN IV SEVERE PAIN LEVEL 7-10;  Start 5/12 /17 at 22:30


Famotidine (Pepcid) 20 mg Q12 PO  Last administered on 5/16/17at 08:08; Admin 

Dose 20 MG;  Start 5/13/17 at 09:00











ALCIDES LOONEY MD May 16, 2017 09:49

## 2017-05-17 VITALS — RESPIRATION RATE: 18 BRPM | DIASTOLIC BLOOD PRESSURE: 64 MMHG | SYSTOLIC BLOOD PRESSURE: 112 MMHG | HEART RATE: 69 BPM

## 2017-05-17 VITALS — SYSTOLIC BLOOD PRESSURE: 119 MMHG | DIASTOLIC BLOOD PRESSURE: 58 MMHG | RESPIRATION RATE: 22 BRPM | HEART RATE: 72 BPM

## 2017-05-17 VITALS — RESPIRATION RATE: 19 BRPM | DIASTOLIC BLOOD PRESSURE: 62 MMHG | SYSTOLIC BLOOD PRESSURE: 100 MMHG | HEART RATE: 74 BPM

## 2017-05-17 VITALS — SYSTOLIC BLOOD PRESSURE: 100 MMHG | RESPIRATION RATE: 18 BRPM | DIASTOLIC BLOOD PRESSURE: 61 MMHG | HEART RATE: 76 BPM

## 2017-05-17 VITALS — RESPIRATION RATE: 18 BRPM | SYSTOLIC BLOOD PRESSURE: 101 MMHG | DIASTOLIC BLOOD PRESSURE: 58 MMHG | HEART RATE: 66 BPM

## 2017-05-17 VITALS — HEART RATE: 73 BPM | DIASTOLIC BLOOD PRESSURE: 59 MMHG | RESPIRATION RATE: 18 BRPM | SYSTOLIC BLOOD PRESSURE: 105 MMHG

## 2017-05-17 VITALS — RESPIRATION RATE: 18 BRPM | DIASTOLIC BLOOD PRESSURE: 46 MMHG | HEART RATE: 72 BPM | SYSTOLIC BLOOD PRESSURE: 92 MMHG

## 2017-05-17 VITALS — RESPIRATION RATE: 18 BRPM | SYSTOLIC BLOOD PRESSURE: 110 MMHG | HEART RATE: 69 BPM | DIASTOLIC BLOOD PRESSURE: 64 MMHG

## 2017-05-17 VITALS — SYSTOLIC BLOOD PRESSURE: 100 MMHG | DIASTOLIC BLOOD PRESSURE: 58 MMHG | HEART RATE: 76 BPM | RESPIRATION RATE: 16 BRPM

## 2017-05-17 VITALS — SYSTOLIC BLOOD PRESSURE: 91 MMHG | DIASTOLIC BLOOD PRESSURE: 55 MMHG | RESPIRATION RATE: 18 BRPM | HEART RATE: 70 BPM

## 2017-05-17 VITALS — RESPIRATION RATE: 15 BRPM | DIASTOLIC BLOOD PRESSURE: 58 MMHG | HEART RATE: 72 BPM | SYSTOLIC BLOOD PRESSURE: 98 MMHG

## 2017-05-17 VITALS — HEART RATE: 87 BPM | SYSTOLIC BLOOD PRESSURE: 111 MMHG | RESPIRATION RATE: 14 BRPM | DIASTOLIC BLOOD PRESSURE: 56 MMHG

## 2017-05-17 VITALS — DIASTOLIC BLOOD PRESSURE: 57 MMHG | RESPIRATION RATE: 18 BRPM | SYSTOLIC BLOOD PRESSURE: 102 MMHG | HEART RATE: 70 BPM

## 2017-05-17 VITALS — RESPIRATION RATE: 17 BRPM | HEART RATE: 68 BPM | DIASTOLIC BLOOD PRESSURE: 55 MMHG | SYSTOLIC BLOOD PRESSURE: 103 MMHG

## 2017-05-17 VITALS — RESPIRATION RATE: 18 BRPM | DIASTOLIC BLOOD PRESSURE: 57 MMHG | HEART RATE: 72 BPM | SYSTOLIC BLOOD PRESSURE: 102 MMHG

## 2017-05-17 VITALS — DIASTOLIC BLOOD PRESSURE: 54 MMHG | RESPIRATION RATE: 17 BRPM | SYSTOLIC BLOOD PRESSURE: 121 MMHG

## 2017-05-17 VITALS — RESPIRATION RATE: 15 BRPM | HEART RATE: 76 BPM | DIASTOLIC BLOOD PRESSURE: 65 MMHG | SYSTOLIC BLOOD PRESSURE: 101 MMHG

## 2017-05-17 VITALS — SYSTOLIC BLOOD PRESSURE: 101 MMHG | RESPIRATION RATE: 19 BRPM | HEART RATE: 72 BPM | DIASTOLIC BLOOD PRESSURE: 58 MMHG

## 2017-05-17 VITALS — SYSTOLIC BLOOD PRESSURE: 118 MMHG | RESPIRATION RATE: 20 BRPM | DIASTOLIC BLOOD PRESSURE: 63 MMHG

## 2017-05-17 VITALS — RESPIRATION RATE: 23 BRPM | SYSTOLIC BLOOD PRESSURE: 114 MMHG | DIASTOLIC BLOOD PRESSURE: 63 MMHG

## 2017-05-17 VITALS — DIASTOLIC BLOOD PRESSURE: 50 MMHG | RESPIRATION RATE: 16 BRPM | SYSTOLIC BLOOD PRESSURE: 84 MMHG

## 2017-05-17 LAB
ADD SCAN DIFF: NO
ALBUMIN SERPL-MCNC: 2 G/DL (ref 3.3–4.9)
ALBUMIN SERPL-MCNC: 2 G/DL (ref 3.3–4.9)
ALBUMIN/GLOB SERPL: 0.58 {RATIO}
ALP SERPL-CCNC: 188 IU/L (ref 42–121)
ALP SERPL-CCNC: 191 IU/L (ref 42–121)
ALT SERPL-CCNC: 460 IU/L (ref 13–69)
ALT SERPL-CCNC: 460 IU/L (ref 13–69)
AMYLASE SERPL-CCNC: 62 U/L (ref 11–123)
ANION GAP SERPL CALC-SCNC: 7 MMOL/L (ref 8–16)
AST SERPL-CCNC: 635 IU/L (ref 15–46)
AST SERPL-CCNC: 647 IU/L (ref 15–46)
BASOPHILS # BLD AUTO: 0 10^3/UL (ref 0–0.1)
BASOPHILS NFR BLD: 0.6 % (ref 0–2)
BILIRUB DIRECT SERPL-MCNC: 2 MG/DL (ref 0–0.2)
BILIRUB DIRECT SERPL-MCNC: 2 MG/DL (ref 0–0.2)
BILIRUB SERPL-MCNC: 2.9 MG/DL (ref 0.2–1.3)
BILIRUB SERPL-MCNC: 2.9 MG/DL (ref 0.2–1.3)
BUN SERPL-MCNC: 6 MG/DL (ref 7–20)
CALCIUM SERPL-MCNC: 7.8 MG/DL (ref 8.4–10.2)
CHLORIDE SERPL-SCNC: 103 MMOL/L (ref 97–110)
CO2 SERPL-SCNC: 27 MMOL/L (ref 21–31)
CREAT SERPL-MCNC: 0.49 MG/DL (ref 0.61–1.24)
EOSINOPHIL # BLD: 0.5 10^3/UL (ref 0–0.5)
EOSINOPHIL NFR BLD: 8.6 % (ref 0–7)
ERYTHROCYTE [DISTWIDTH] IN BLOOD BY AUTOMATED COUNT: 15.9 % (ref 11.5–14.5)
GLOBULIN SER-MCNC: 3.4 G/DL (ref 1.3–3.2)
GLUCOSE SERPL-MCNC: 92 MG/DL (ref 70–220)
HCT VFR BLD CALC: 32 % (ref 42–52)
HGB BLD-MCNC: 10.9 G/DL (ref 14–18)
HOMOCYSTEINE - CARDIOVASCULAR: 5.8 UMOL/L (ref ?–11.4)
LYMPHOCYTES # BLD AUTO: 1.6 10^3/UL (ref 0.8–2.9)
LYMPHOCYTES NFR BLD AUTO: 30.3 % (ref 15–51)
MAGNESIUM SERPL-MCNC: 1.7 MG/DL (ref 1.7–2.5)
MCH RBC QN AUTO: 28.4 PG (ref 29–33)
MCHC RBC AUTO-ENTMCNC: 34.1 G/DL (ref 32–37)
MCV RBC AUTO: 83.3 FL (ref 82–101)
MONOCYTES # BLD: 0.8 10^3/UL (ref 0.3–0.9)
MONOCYTES NFR BLD: 15.5 % (ref 0–11)
NEUTROPHILS # BLD: 2.3 10^3/UL (ref 1.6–7.5)
NEUTROPHILS NFR BLD AUTO: 44.8 % (ref 39–77)
NRBC # BLD MANUAL: 0 10^3/UL (ref 0–0)
NRBC BLD QL: 0 /100WBC (ref 0–0)
PLATELET # BLD: 170 10^3/UL (ref 140–415)
PMV BLD AUTO: 12.4 FL (ref 7.4–10.4)
POTASSIUM SERPL-SCNC: 3.9 MMOL/L (ref 3.5–5.1)
PROT SERPL-MCNC: 5.3 G/DL (ref 6.1–8.1)
PROT SERPL-MCNC: 5.4 G/DL (ref 6.1–8.1)
RBC # BLD AUTO: 3.84 10^6/UL (ref 4.7–6.1)
SODIUM SERPL-SCNC: 133 MMOL/L (ref 135–144)
WBC # BLD AUTO: 5.2 10^3/UL (ref 4.8–10.8)

## 2017-05-17 PROCEDURE — 0FJB8ZZ INSPECTION OF HEPATOBILIARY DUCT, VIA NATURAL OR ARTIFICIAL OPENING ENDOSCOPIC: ICD-10-PCS | Performed by: INTERNAL MEDICINE

## 2017-05-17 RX ADMIN — FAMOTIDINE SCH MG: 20 TABLET ORAL at 21:46

## 2017-05-17 RX ADMIN — DEXAMETHASONE SODIUM PHOSPHATE PRN MG: 10 INJECTION, SOLUTION INTRAMUSCULAR; INTRAVENOUS at 17:09

## 2017-05-17 RX ADMIN — ALBUTEROL SULFATE SCH PUFF: 90 AEROSOL, METERED RESPIRATORY (INHALATION) at 02:00

## 2017-05-17 RX ADMIN — ALBUTEROL SULFATE SCH PUFF: 90 AEROSOL, METERED RESPIRATORY (INHALATION) at 20:00

## 2017-05-17 RX ADMIN — ALBUTEROL SULFATE SCH PUFF: 90 AEROSOL, METERED RESPIRATORY (INHALATION) at 13:41

## 2017-05-17 RX ADMIN — ALBUTEROL SULFATE SCH PUFF: 90 AEROSOL, METERED RESPIRATORY (INHALATION) at 08:00

## 2017-05-17 RX ADMIN — FAMOTIDINE SCH MG: 20 TABLET ORAL at 08:08

## 2017-05-17 NOTE — PN
Date/Time of Note


Date/Time of Note


DATE: 5/17/17 


TIME: 19:39





Assessment/Plan


VTE Prophylaxis


VTE Prophylaxis Intervention:  SCD's





Lines/Catheters


IV Catheter Type (from [facility]):  Saline Lock


Urinary Cath still in place:  No





Assessment/Plan


Assessment/Plan


1.  Hyperbilirubinemia with transaminitis, possibly autoimmune hepatitis.  

Hepatitis panel negative.  The patient is being evaluated for an autoimmune 

hepatitis.


2.  Positive tumor markers.  The patient has positive CA 19-9 and CEA.  Also, 

the patient's alpha fetoprotein is negative.  We will involve oncology on the 

case.


3.  Polyarthritis with decreased range of motion.  Etiology unclear.


4.  Asthma.  Stable.  Continue p.r.n. inhaled bronchodilators.


5.  Normocytic anemia.  Etiology unclear.  We will monitor the H and H closely.

  We will order an iron panel.


 Deep venous thrombosis prophylaxis.  Ambulation.


 Gastrointestinal prophylaxis.  Histamine 2 receptor blockers.


 


PLAN:  Continue inpatient monitoring.  Await blood work including small muscle 

antibody.  s/p GI cosult, possibel ERCP plan, awaiting LFTs to improve better





Subjective


24 Hr Interval Summary


Free Text/Dictation


Jaundice Improving but LFTs are still high, BP low





Exam/Review of Systems


Vital Signs


Vitals





 Vital Signs








  Date Time  Temp Pulse Resp B/P Pulse Ox O2 Delivery O2 Flow Rate FiO2


 


5/17/17 10:17  70 18  97   21


 


5/17/17 08:54    91/55    


 


5/17/17 08:16 98.6       














 Intake and Output   


 


 5/16/17 5/16/17 5/17/17





 15:00 23:00 07:00


 


Intake Total  1640 ml 300 ml


 


Balance  1640 ml 300 ml











Exam


General: The patient is well-developed, Not  in acute distress.


HEENT: Conjunctivae is icteric, normocephalic. The pupils are equal and round .


 Neck: Supple with full range of motion. 


Chest: Normal expansion of the thorax during inspiration


Lungs: Clear to auscultation bilaterally


Heart: Normal S1-S2, Regular rhythm and rate.


Abdomen: Soft , nontender,  nondistended , bowel sounds are present.


Extremities: Normal to inspection, no edema no cyanosis


Neurologic: Normal mental status,The patient is awake,  alert and oriented .





Results


Result Diagram:  


5/17/17 0425                                                                   

             5/17/17 0425





Results 24 hrs





Laboratory Tests








Test


  5/17/17


04:25


 


White Blood Count 5.2  


 


Red Blood Count 3.84  L


 


Hemoglobin 10.9  L


 


Hematocrit 32.0  L


 


Mean Corpuscular Volume 83.3  


 


Mean Corpuscular Hemoglobin 28.4  L


 


Mean Corpuscular Hemoglobin


Concent 34.1  


 


 


Red Cell Distribution Width 15.9  H


 


Platelet Count 170  


 


Mean Platelet Volume 12.4  H


 


Neutrophils % 44.8  


 


Lymphocytes % 30.3  


 


Monocytes % 15.5  H


 


Eosinophils % 8.6  H


 


Basophils % 0.6  


 


Nucleated Red Blood Cells % 0.0  


 


Neutrophils # 2.3  


 


Lymphocytes # 1.6  


 


Monocytes # 0.8  


 


Eosinophils # 0.5  


 


Basophils # 0.0  


 


Nucleated Red Blood Cells # 0.0  


 


Sodium Level 133  L


 


Potassium Level 3.9  


 


Chloride Level 103  


 


Carbon Dioxide Level 27  


 


Anion Gap 7  L


 


Blood Urea Nitrogen 6  L


 


Creatinine 0.49  L


 


Glucose Level 92  


 


Calcium Level 7.8  L


 


Magnesium Level 1.7  


 


Total Bilirubin 2.9  H


 


Direct Bilirubin 2.00  H


 


Indirect Bilirubin 0.9  


 


Aspartate Amino Transf


(AST/SGOT) 635  H


 


 


Alanine Aminotransferase


(ALT/SGPT) 460  H


 


 


Alkaline Phosphatase 191  H


 


Total Protein 5.4  L


 


Albumin 2.0  L


 


Globulin 3.40  H


 


Albumin/Globulin Ratio 0.58  


 


Amylase Level 62  


 


Lipase 200  











Medications


Medications





 Current Medications


Ondansetron HCl (Zofran Inj) 4 mg Q6H  PRN IV NAUSEA AND/OR VOMITING Last 

administered on 5/17/17at 17:09; Admin Dose 4 MG;  Start 5/12/17 at 22:30


Morphine Sulfate (morphine) 2 mg Q4H  PRN IV SEVERE PAIN LEVEL 7-10;  Start 5/12 /17 at 22:30


Famotidine (Pepcid) 20 mg Q12 PO  Last administered on 5/16/17at 20:21; Admin 

Dose 20 MG;  Start 5/13/17 at 09:00











ZACK GALLEGO MD May 17, 2017 19:42

## 2017-05-17 NOTE — CONS
Date/Time of Note


Date/Time of Note


DATE: 5/17/17 


TIME: 13:46





Assessment/Plan


Assessment/Plan


Chief Complaint/Hosp Course


The patient is a 22 year old male with hyperbilirubinemia, trending down since 

admission, of unclear etiology with negative hepatitis panel, undergoing work-

up for autoimmune hepatitis, with significantly elevated CA 19-9 at 869 (0-37) 

and CEA mildly elevated at 7.2 (0-5).  AFP normal at 2.77 (0-7.21).





- MRCP 5/13/17 demonstrated hepatomegaly at 20 cm with no focal hepatic mass is 

identified, hepatic periportal edema is noted, which can be seen in the setting 

of hepatitis.  Gallbladder is contracted, and demonstrates circumferential wall 

edema, likely reactive secondary to underlying liver disease.  Splenomegaly is 

noted measuring 14 cm.  Biliary tree, pancreas, adrenal glands and kidneys are 

unremarkable.  There is no retroperitoneal or lacy hepatis lymphadenopathy.  

Mild upper abdominal ascites is noted.  


- Appreciate GI recs.  Plan for ERCP per GI.


- If ERCP negative, consider EUS to rule out pancreaticobiliary lesion. CA 19-9 

is significantly elevated and may be due to underlying inflammatory/autoimmune 

process, however would need to rule out underlying pancreaticobiliary 

malignancy such as pancreatic or intrahepatic cholangiocarcinoma.  Patient can 

follow-up with us as an outpatient for EUS/GI referral.





# Normocytic anemia, iron panel consistent with anemia of chronic inflammation, 

B12/folate/TSH WNL, retic count inappropriately low, LDH elevated, haptoglobin 

pending; pending MMA, homocysteine.  





# Polyarthritis, REID negative, anti-mitochondrial Ab neg, smooth muscle Ab 

negative, ds DNA elevated at 384.  Rheumatology follow-up.


Problems:  





Consultation Date/Type/Reason


Admit Date/Time


May 12, 2017 at 21:48


Initial Consult Date


5/15/17


Type of Consultation:  Oncology





24 HR Interval Summary


Free Text/Dictation


Patient states that he feels normal and denies pain at this time.  Plan for 

ERCP tonight.





Exam/Review of Systems


Vital Signs


Vitals





 Vital Signs








  Date Time  Temp Pulse Resp B/P Pulse Ox O2 Delivery O2 Flow Rate FiO2


 


5/17/17 10:17  70 18  97   21


 


5/17/17 08:54    91/55    


 


5/17/17 08:16 98.6       














 Intake and Output   


 


 5/16/17 5/16/17 5/17/17





 15:00 23:00 07:00


 


Intake Total  1640 ml 300 ml


 


Balance  1640 ml 300 ml











Exam


Constitutional:  alert, oriented


Psych:  no complaints


Eyes:  icteric


Neck:  supple


Respiratory:  clear to auscultation


Cardiovascular:  regular rate and rhythm


Gastrointestinal:  non-tender, soft


Musculoskeletal:  nl extremities to inspection


Neurological:  CNS II-XII intact





Results


Result Diagram:  


5/17/17 0425                                                                   

             5/17/17 0425





Results 24 hrs





Laboratory Tests








Test


  5/17/17


04:25


 


White Blood Count 5.2  


 


Red Blood Count 3.84  L


 


Hemoglobin 10.9  L


 


Hematocrit 32.0  L


 


Mean Corpuscular Volume 83.3  


 


Mean Corpuscular Hemoglobin 28.4  L


 


Mean Corpuscular Hemoglobin


Concent 34.1  


 


 


Red Cell Distribution Width 15.9  H


 


Platelet Count 170  


 


Mean Platelet Volume 12.4  H


 


Neutrophils % 44.8  


 


Lymphocytes % 30.3  


 


Monocytes % 15.5  H


 


Eosinophils % 8.6  H


 


Basophils % 0.6  


 


Nucleated Red Blood Cells % 0.0  


 


Neutrophils # 2.3  


 


Lymphocytes # 1.6  


 


Monocytes # 0.8  


 


Eosinophils # 0.5  


 


Basophils # 0.0  


 


Nucleated Red Blood Cells # 0.0  


 


Sodium Level 133  L


 


Potassium Level 3.9  


 


Chloride Level 103  


 


Carbon Dioxide Level 27  


 


Anion Gap 7  L


 


Blood Urea Nitrogen 6  L


 


Creatinine 0.49  L


 


Glucose Level 92  


 


Calcium Level 7.8  L


 


Magnesium Level 1.7  


 


Total Bilirubin 2.9  H


 


Direct Bilirubin 2.00  H


 


Indirect Bilirubin 0.9  


 


Aspartate Amino Transf


(AST/SGOT) 635  H


 


 


Alanine Aminotransferase


(ALT/SGPT) 460  H


 


 


Alkaline Phosphatase 191  H


 


Total Protein 5.4  L


 


Albumin 2.0  L


 


Globulin 3.40  H


 


Albumin/Globulin Ratio 0.58  


 


Amylase Level 62  


 


Lipase 200  











Medications


Medications





 Current Medications


Ondansetron HCl (Zofran Inj) 4 mg Q6H  PRN IV NAUSEA AND/OR VOMITING;  Start 5/ 12/17 at 22:30


Morphine Sulfate (morphine) 2 mg Q4H  PRN IV SEVERE PAIN LEVEL 7-10;  Start 5/12 /17 at 22:30


Famotidine (Pepcid) 20 mg Q12 PO  Last administered on 5/16/17at 20:21; Admin 

Dose 20 MG;  Start 5/13/17 at 09:00











TOALCIDES MD May 17, 2017 13:46

## 2017-05-18 VITALS — DIASTOLIC BLOOD PRESSURE: 50 MMHG | SYSTOLIC BLOOD PRESSURE: 92 MMHG | RESPIRATION RATE: 14 BRPM

## 2017-05-18 VITALS — SYSTOLIC BLOOD PRESSURE: 101 MMHG | HEART RATE: 65 BPM | RESPIRATION RATE: 18 BRPM | DIASTOLIC BLOOD PRESSURE: 54 MMHG

## 2017-05-18 VITALS — RESPIRATION RATE: 18 BRPM | DIASTOLIC BLOOD PRESSURE: 56 MMHG | SYSTOLIC BLOOD PRESSURE: 105 MMHG

## 2017-05-18 LAB
ADD SCAN DIFF: NO
ALBUMIN SERPL-MCNC: 2.1 G/DL (ref 3.3–4.9)
ALBUMIN SERPL-MCNC: 2.2 G/DL (ref 3.3–4.9)
ALBUMIN/GLOB SERPL: 0.53 {RATIO}
ALP SERPL-CCNC: 224 IU/L (ref 42–121)
ALP SERPL-CCNC: 228 IU/L (ref 42–121)
ALT SERPL-CCNC: 501 IU/L (ref 13–69)
ALT SERPL-CCNC: 502 IU/L (ref 13–69)
AMYLASE SERPL-CCNC: 125 U/L (ref 11–123)
ANION GAP SERPL CALC-SCNC: 13 MMOL/L (ref 8–16)
APTT BLD: 36.4 SEC (ref 25–35)
AST SERPL-CCNC: 659 IU/L (ref 15–46)
AST SERPL-CCNC: 667 IU/L (ref 15–46)
BASOPHILS # BLD AUTO: 0 10^3/UL (ref 0–0.1)
BASOPHILS NFR BLD: 0.2 % (ref 0–2)
BILIRUB DIRECT SERPL-MCNC: 2.1 MG/DL (ref 0–0.2)
BILIRUB DIRECT SERPL-MCNC: 2.1 MG/DL (ref 0–0.2)
BILIRUB SERPL-MCNC: 2.9 MG/DL (ref 0.2–1.3)
BILIRUB SERPL-MCNC: 2.9 MG/DL (ref 0.2–1.3)
BUN SERPL-MCNC: 11 MG/DL (ref 7–20)
CALCIUM SERPL-MCNC: 8.4 MG/DL (ref 8.4–10.2)
CHLORIDE SERPL-SCNC: 101 MMOL/L (ref 97–110)
CO2 SERPL-SCNC: 26 MMOL/L (ref 21–31)
CREAT SERPL-MCNC: 0.58 MG/DL (ref 0.61–1.24)
EOSINOPHIL # BLD: 0 10^3/UL (ref 0–0.5)
EOSINOPHIL NFR BLD: 0.2 % (ref 0–7)
ERYTHROCYTE [DISTWIDTH] IN BLOOD BY AUTOMATED COUNT: 15.7 % (ref 11.5–14.5)
GLOBULIN SER-MCNC: 3.9 G/DL (ref 1.3–3.2)
GLUCOSE SERPL-MCNC: 125 MG/DL (ref 70–220)
HCT VFR BLD CALC: 32.8 % (ref 42–52)
HGB BLD-MCNC: 11.4 G/DL (ref 14–18)
INR PPP: 1.38
LYMPHOCYTES # BLD AUTO: 1 10^3/UL (ref 0.8–2.9)
LYMPHOCYTES NFR BLD AUTO: 19.4 % (ref 15–51)
MCH RBC QN AUTO: 29.2 PG (ref 29–33)
MCHC RBC AUTO-ENTMCNC: 34.8 G/DL (ref 32–37)
MCV RBC AUTO: 84.1 FL (ref 82–101)
MONOCYTES # BLD: 0.4 10^3/UL (ref 0.3–0.9)
MONOCYTES NFR BLD: 7.9 % (ref 0–11)
NEUTROPHILS # BLD: 3.6 10^3/UL (ref 1.6–7.5)
NEUTROPHILS NFR BLD AUTO: 71.9 % (ref 39–77)
NRBC # BLD MANUAL: 0 10^3/UL (ref 0–0)
NRBC BLD QL: 0 /100WBC (ref 0–0)
PLATELET # BLD: 181 10^3/UL (ref 140–415)
PMV BLD AUTO: 12.1 FL (ref 7.4–10.4)
POTASSIUM SERPL-SCNC: 4.9 MMOL/L (ref 3.5–5.1)
PROT SERPL-MCNC: 5.9 G/DL (ref 6.1–8.1)
PROT SERPL-MCNC: 6 G/DL (ref 6.1–8.1)
PROTHROMBIN TIME: 17 SEC (ref 12.2–14.2)
PT RATIO: 1.3
RBC # BLD AUTO: 3.9 10^6/UL (ref 4.7–6.1)
SODIUM SERPL-SCNC: 135 MMOL/L (ref 135–144)
WBC # BLD AUTO: 5 10^3/UL (ref 4.8–10.8)

## 2017-05-18 PROCEDURE — 30233K1 TRANSFUSION OF NONAUTOLOGOUS FROZEN PLASMA INTO PERIPHERAL VEIN, PERCUTANEOUS APPROACH: ICD-10-PCS

## 2017-05-18 RX ADMIN — FOLIC ACID SCH MLS/HR: 5 INJECTION, SOLUTION INTRAMUSCULAR; INTRAVENOUS; SUBCUTANEOUS at 23:39

## 2017-05-18 RX ADMIN — FAMOTIDINE SCH MG: 20 TABLET ORAL at 20:32

## 2017-05-18 RX ADMIN — ALBUTEROL SULFATE SCH PUFF: 90 AEROSOL, METERED RESPIRATORY (INHALATION) at 20:00

## 2017-05-18 RX ADMIN — ALBUTEROL SULFATE SCH PUFF: 90 AEROSOL, METERED RESPIRATORY (INHALATION) at 02:00

## 2017-05-18 RX ADMIN — FAMOTIDINE SCH MG: 20 TABLET ORAL at 09:00

## 2017-05-18 RX ADMIN — ALBUTEROL SULFATE SCH PUFF: 90 AEROSOL, METERED RESPIRATORY (INHALATION) at 14:00

## 2017-05-18 RX ADMIN — ALBUTEROL SULFATE SCH PUFF: 90 AEROSOL, METERED RESPIRATORY (INHALATION) at 08:00

## 2017-05-18 RX ADMIN — PHYTONADIONE SCH MG: 10 INJECTION, EMULSION INTRAMUSCULAR; INTRAVENOUS; SUBCUTANEOUS at 14:42

## 2017-05-18 NOTE — PN
Date/Time of Note


Date/Time of Note


DATE: 5/18/17 


TIME: 17:13





Assessment/Plan


VTE Prophylaxis


VTE Prophylaxis Intervention:  SCD's





Lines/Catheters


IV Catheter Type (from [facility]):  Peripheral IV


Urinary Cath still in place:  No





Assessment/Plan


Assessment/Plan


1.  Hyperbilirubinemia with transaminitis, possibly autoimmune hepatitis.  

Hepatitis panel negative.  The patient is being evaluated for an autoimmune 

hepatitis.


2.  Positive tumor markers.  The patient has positive CA 19-9 and CEA.  Also, 

the patient's alpha fetoprotein is negative.  We will involve oncology on the 

case.


3.  Polyarthritis with decreased range of motion.  Etiology unclear.


4.  Asthma.  Stable.  Continue p.r.n. inhaled bronchodilators.


5.  Normocytic anemia.  Etiology unclear.  We will monitor the H and H closely.

  We will order an iron panel.


 Deep venous thrombosis prophylaxis.  Ambulation.


 Gastrointestinal prophylaxis.  Histamine 2 receptor blockers.


 


PLAN:  Continue inpatient monitoring.  Biopsy cancelled due to elevated PTT and 

INR, GI Following, will give vitamin K 10mg PO daily x 3 days, transfuse 2 

units FFP


order PT, PTT, INR in AM


will follow up





Subjective


24 Hr Interval Summary


Free Text/Dictation


pt biopsy cancelled due to mildly elevated PTT and INR, resumed on diet, LFTs 

still high, afebrile, BP stable





Exam/Review of Systems


Vital Signs


Vitals





 Vital Signs








  Date Time  Temp Pulse Resp B/P Pulse Ox O2 Delivery O2 Flow Rate FiO2


 


5/18/17 08:10 97.4 55 14 92/50 98   


 


5/18/17 00:30      Room Air  


 


5/17/17 10:17        21














 Intake and Output   


 


 5/17/17 5/17/17 5/18/17





 15:00 23:00 07:00


 


Intake Total   480 ml


 


Balance   480 ml











Exam


General: The patient is well-developed, Not  in acute distress.


HEENT: Conjunctivae is icteric, normocephalic. The pupils are equal and round .


 Neck: Supple with full range of motion. 


Chest: Normal expansion of the thorax during inspiration


Lungs: Clear to auscultation bilaterally


Heart: Normal S1-S2, Regular rhythm and rate.


Abdomen: Soft , nontender,  nondistended , bowel sounds are present.


Extremities: Normal to inspection, no edema no cyanosis


Neurologic: Normal mental status,The patient is awake,  alert and oriented .





Results


Result Diagram:  


5/18/17 0445                                                                   

             5/18/17 0445





Results 24 hrs





Laboratory Tests








Test


  5/18/17


04:25 5/18/17


04:45 5/18/17


09:33


 


Total Bilirubin 2.9  H 2.9  H 


 


Direct Bilirubin 2.10  H 2.10  H 


 


Indirect Bilirubin 0.8   0.8   


 


Aspartate Amino Transf


(AST/SGOT) 659  H


  667  H


  


 


 


Alanine Aminotransferase


(ALT/SGPT) 502  H


  501  H


  


 


 


Alkaline Phosphatase 224  H 228  H 


 


Total Protein 5.9  L 6.0  L 


 


Albumin 2.2  L 2.1  L 


 


Amylase Level 125  H  


 


Lipase 104    


 


White Blood Count  5.0   


 


Red Blood Count  3.90  L 


 


Hemoglobin  11.4  L 


 


Hematocrit  32.8  L 


 


Mean Corpuscular Volume  84.1   


 


Mean Corpuscular Hemoglobin  29.2   


 


Mean Corpuscular Hemoglobin


Concent 


  34.8  


  


 


 


Red Cell Distribution Width  15.7  H 


 


Platelet Count  181   


 


Mean Platelet Volume  12.1  H 


 


Neutrophils %  71.9   


 


Lymphocytes %  19.4   


 


Monocytes %  7.9   


 


Eosinophils %  0.2   


 


Basophils %  0.2   


 


Nucleated Red Blood Cells %  0.0   


 


Neutrophils #  3.6   


 


Lymphocytes #  1.0   


 


Monocytes #  0.4   


 


Eosinophils #  0.0   


 


Basophils #  0.0   


 


Nucleated Red Blood Cells #  0.0   


 


Sodium Level  135   


 


Potassium Level  4.9   


 


Chloride Level  101   


 


Carbon Dioxide Level  26   


 


Anion Gap  13   


 


Blood Urea Nitrogen  11   


 


Creatinine  0.58  L 


 


Glucose Level  125   


 


Calcium Level  8.4   


 


Globulin  3.90  H 


 


Albumin/Globulin Ratio  0.53   


 


CA 19-9 Antigen  571.0  H 


 


Prothrombin Time   17.0  H


 


Prothrombin Time Ratio   1.3  


 


INR International Normalized


Ratio 


  


  1.38  


 


 


Activated Partial


Thromboplast Time 


  


  36.4  H


 











Medications


Medications





 Current Medications


Ondansetron HCl (Zofran Inj) 4 mg Q6H  PRN IV NAUSEA AND/OR VOMITING Last 

administered on 5/17/17at 17:09; Admin Dose 4 MG;  Start 5/12/17 at 22:30


Morphine Sulfate (morphine) 2 mg Q4H  PRN IV SEVERE PAIN LEVEL 7-10;  Start 5/12 /17 at 22:30


Famotidine (Pepcid) 20 mg Q12 PO  Last administered on 5/17/17at 21:46; Admin 

Dose 20 MG;  Start 5/13/17 at 09:00


Phytonadione (Vitamin K Soln) 10 mg DAILY PO  Last administered on 5/18/17at 14:

42; Admin Dose 10 MG;  Start 5/18/17 at 13:30;  Stop 5/20/17 at 09:01











ZACK GALLEGO MD May 18, 2017 17:15

## 2017-05-18 NOTE — GILP
DATE OF PROCEDURE:  

 

 

PROCEDURE:  Endoscopic retrograde cholangiopancreatography.

 

PREMEDICATION:  General anesthesia by anesthesiologist.

 

SURGEON: Bret Pritchard MD.

 

INSTRUMENT USED:  Olympus side viewing panendoscope.

 

TECHNIQUE: After informed consent, with the patient/relatives understanding the procedure, its indic
ations, potential risks and complications, including but not limited to: allergic reaction, bleeding
, perforation or infection, and after all pertinent questions were answered to the patients satisfac
tion, the patient/relatives signed witnessed informed consent. 

 

Following this, premedication was administered slowly IV push under careful cardiovascular and respi
ratory monitoring with pulse oximetry, automatic blood pressure and EKG monitor.

 

Once the sedative effect was achieved the patient was place in the prone position in the radiology s
pecial procedures suite; the side viewing panendoscope was introduced and advanced under visual cont
rol.

 

Careful examination of the upper gastrointestinal tract, both on insertion as well as withdrawal of 
the instrument disclosed the following findings:

 

ESOPHAGUS:  The mucosa of the entire esophagus appears within normal limits. There is no evidence of
 esophagitis, varices, neoplasm or stricture. No Hiatal Hernia identified.

 

STOMACH:  Upon entrance to the stomach, air was insufflated, the gastric walls distended normally.  
There are large amounts of partially digested food in the stomach.  This was suctioned out to the be
st of our ability, almost entirely.  

 

Otherwise, the mucosa appears unremarkable.

 

PYLORUS:  The pylorus appears patent and within normal limits, with no evidence of gastric outlet ob
struction.

 

DUODENUM:  The duodenal mucosa was carefully examined in the duodenal bulb as well as the second por
tion of the duodenum and appears unremarkable with no evidence of duodenitis, ulcer or neoplasm.

 

AMPULLA OF VATER:  The ampulla of Vater was identified and carefully examined appearing within cali
l limits.

 

CANNULATION:  At this point cannulation was accomplished without difficulty opacifying the biliary t
ree which appears entirely unremarkable.  The cystic duct is patent.  The gallbladder is also ____.

 

IMPRESSION:  

1.  Retained fluid in the stomach (suctioned out.)

2.  ? gastroparesis.

3.  Normal ampulla of Vater.  

4.  Normal cholangiogram.  

 

PLAN:  Add Reglan, continue monitoring liver function tests, and further recommendations will depend
 on the patient's clinical course.

 

 

Dictated By: BRET PRITCHARD MS/ELIOT

DD:    05/17/2017 19:30:10

DT:    05/18/2017 05:56:06

Conf#: [id number]

DID#:  [id number]

## 2017-05-18 NOTE — RADRPT
PROCEDURE: X-ray fluoroscopy guidance

 

CLINICAL INDICATION: Abdominal pain, ERCP, fluoroscopic guidance

 

TECHNIQUE: Fluoroscopic guidance was utilized for an intraoperative procedure.

 

COMPARISON: None available

 

FINDINGS:

Fluoroscopic guidance was utilized for and intraoperative procedure. 45 seconds of fluoroscopy time 
was utilized for the procedure. 3 x-ray images were obtained during the procedure in progress. No gr
oss filling defects are identified in the common bile duct.

 

IMPRESSION:

 

X-ray fluoroscopic guidance utilized for intraoperative procedure.

 

No gross filling defects in the common bile duct.

 

Please see procedure note for details.

 

 

RPTAT: AA

_____________________________________________ 

.Bobby Webber MD, MD           Date    Time 

Electronically viewed and signed by .Bobby Webber MD, MD on 05/18/2017 07:39 

 

D:  05/18/2017 07:39  T:  05/18/2017 07:39

.P/

## 2017-05-18 NOTE — CONS
Date/Time of Note


Date/Time of Note


DATE: 5/18/17 


TIME: 15:22





Assessment/Plan


Assessment/Plan


Chief Complaint/Hosp Course


The patient is a 22 year old male with hyperbilirubinemia, trending down since 

admission, of unclear etiology with negative hepatitis panel, undergoing work-

up for autoimmune hepatitis, with significantly elevated CA 19-9 at 869 (0-37) 

and CEA mildly elevated at 7.2 (0-5).  AFP normal at 2.77 (0-7.21).  Repeat CA 

19-9 decreased to 571 on 5/18/17.





- MRCP 5/13/17 demonstrated hepatomegaly at 20 cm with no focal hepatic mass is 

identified, hepatic periportal edema is noted, which can be seen in the setting 

of hepatitis.  Gallbladder is contracted, and demonstrates circumferential wall 

edema, likely reactive secondary to underlying liver disease.  Splenomegaly is 

noted measuring 14 cm.  Biliary tree, pancreas, adrenal glands and kidneys are 

unremarkable.  There is no retroperitoneal or lacy hepatis lymphadenopathy.  

Mild upper abdominal ascites is noted.  


- Appreciate GI recs.  s/p ERCP 5/7/17 that showed no filling defects in CBD, ?

gastroparesis for which Reglan was added.


- Will consider outpatient EUS to rule out pancreaticobiliary lesion. CA 19-9 

is significantly elevated and may be due to underlying inflammatory/autoimmune 

process, however would need to rule out underlying pancreaticobiliary 

malignancy such as pancreatic or intrahepatic cholangiocarcinoma.  Patient can 

follow-up with us as an outpatient for EUS/GI referral.


- Pending liver biopsy, cancelled for now due to elevated coags, receiving 

Vitamin K and FFP.





# Normocytic anemia, stable, iron panel consistent with anemia of chronic 

inflammation, B12/folate/TSH WNL, retic count inappropriately low, LDH 

elevated. Pending MMA, homocysteine.  Haptoglobin was < 15, which may be 

secondary to liver disease, however will check peripheral smear to rule out 

schistocytes or spherocytes and check lillie' test. 





# Polyarthritis, REID negative, anti-mitochondrial Ab neg, smooth muscle Ab 

negative, ds DNA elevated at 384.  Rheumatology follow-up.


Problems:  





Consultation Date/Type/Reason


Admit Date/Time


May 12, 2017 at 21:48


Initial Consult Date


5/15/17


Type of Consultation:  Oncology





24 HR Interval Summary


Free Text/Dictation


Patient doing well, no pain.





Exam/Review of Systems


Vital Signs


Vitals





 Vital Signs








  Date Time  Temp Pulse Resp B/P Pulse Ox O2 Delivery O2 Flow Rate FiO2


 


5/18/17 08:10 97.4 55 14 92/50 98   


 


5/18/17 00:30      Room Air  


 


5/17/17 10:17        21














 Intake and Output   


 


 5/17/17 5/17/17 5/18/17





 15:00 23:00 07:00


 


Intake Total   480 ml


 


Balance   480 ml











Exam


Constitutional:  alert, oriented


Psych:  no complaints


Eyes:  icteric


Neck:  supple


Respiratory:  clear to auscultation


Cardiovascular:  regular rate and rhythm


Gastrointestinal:  non-tender, soft


Musculoskeletal:  nl extremities to inspection


Neurological:  CNS II-XII intact





Results


Result Diagram:  


5/18/17 0445                                                                   

             5/18/17 0445





Results 24 hrs





Laboratory Tests








Test


  5/18/17


04:25 5/18/17


04:45 5/18/17


09:33


 


Total Bilirubin 2.9  H 2.9  H 


 


Direct Bilirubin 2.10  H 2.10  H 


 


Indirect Bilirubin 0.8   0.8   


 


Aspartate Amino Transf


(AST/SGOT) 659  H


  667  H


  


 


 


Alanine Aminotransferase


(ALT/SGPT) 502  H


  501  H


  


 


 


Alkaline Phosphatase 224  H 228  H 


 


Total Protein 5.9  L 6.0  L 


 


Albumin 2.2  L 2.1  L 


 


Amylase Level 125  H  


 


Lipase 104    


 


White Blood Count  5.0   


 


Red Blood Count  3.90  L 


 


Hemoglobin  11.4  L 


 


Hematocrit  32.8  L 


 


Mean Corpuscular Volume  84.1   


 


Mean Corpuscular Hemoglobin  29.2   


 


Mean Corpuscular Hemoglobin


Concent 


  34.8  


  


 


 


Red Cell Distribution Width  15.7  H 


 


Platelet Count  181   


 


Mean Platelet Volume  12.1  H 


 


Neutrophils %  71.9   


 


Lymphocytes %  19.4   


 


Monocytes %  7.9   


 


Eosinophils %  0.2   


 


Basophils %  0.2   


 


Nucleated Red Blood Cells %  0.0   


 


Neutrophils #  3.6   


 


Lymphocytes #  1.0   


 


Monocytes #  0.4   


 


Eosinophils #  0.0   


 


Basophils #  0.0   


 


Nucleated Red Blood Cells #  0.0   


 


Sodium Level  135   


 


Potassium Level  4.9   


 


Chloride Level  101   


 


Carbon Dioxide Level  26   


 


Anion Gap  13   


 


Blood Urea Nitrogen  11   


 


Creatinine  0.58  L 


 


Glucose Level  125   


 


Calcium Level  8.4   


 


Globulin  3.90  H 


 


Albumin/Globulin Ratio  0.53   


 


CA 19-9 Antigen  571.0  H 


 


Prothrombin Time   17.0  H


 


Prothrombin Time Ratio   1.3  


 


INR International Normalized


Ratio 


  


  1.38  


 


 


Activated Partial


Thromboplast Time 


  


  36.4  H


 











Medications


Medications





 Current Medications


Ondansetron HCl (Zofran Inj) 4 mg Q6H  PRN IV NAUSEA AND/OR VOMITING Last 

administered on 5/17/17at 17:09; Admin Dose 4 MG;  Start 5/12/17 at 22:30


Morphine Sulfate (morphine) 2 mg Q4H  PRN IV SEVERE PAIN LEVEL 7-10;  Start 5/12 /17 at 22:30


Famotidine (Pepcid) 20 mg Q12 PO  Last administered on 5/17/17at 21:46; Admin 

Dose 20 MG;  Start 5/13/17 at 09:00


Phytonadione (Vitamin K Soln) 10 mg DAILY PO  Last administered on 5/18/17at 14:

42; Admin Dose 10 MG;  Start 5/18/17 at 13:30;  Stop 5/20/17 at 09:01











ALCIDES LOONEY MD May 18, 2017 15:25

## 2017-05-18 NOTE — PN
Date/Time of Note


Date/Time of Note


DATE: 5/18/17 


TIME: 15:25





Assessment/Plan


VTE Prophylaxis


VTE Prophylaxis Intervention:  SCD's





Lines/Catheters


IV Catheter Type (from [facility]):  Peripheral IV


Urinary Cath still in place:  No





Assessment/Plan


Assessment/Plan


Jaundice/Transaminitis


               Hepatocellular consider autoimmune hepatitis 


                  ERCP 5/17/2017 


                          Retained fluid in the stomach (suctioned out.)


                          ? gastroparesis.


                           Normal ampulla of Vater.  


                        .  Normal cholangiogram. 


                  MRI abdomen


                     Hepatomegaly is noted.  Hepatic periportal edema is 

identified, which can be seen in the setting of hepatitis.


                     Splenomegaly is noted measuring 14 cm.


                .  There is mild upper abdominal ascites.


               .  Circumferential gallbladder wall thickening is noted, likely 

reactive secondary to underlying liver disease.  No evidence of cholelithiasis, 

cholecystitis or biliary dilatation is identified.


                  There is mild anasarca.


* Elevated Ca 19-9


* Hx of asthma





PLAN 


* continue present management





Subjective


24 Hr Interval Summary


Free Text/Dictation


* Course reviewed with RN


* Patient seen and examined


* ERCP 5/18/2017


                   Retained fluid in the stomach (suctioned out.)


                   ? gastroparesis.


                 .  Normal ampulla of Vater.  


                    Normal cholangiogram.





Exam/Review of Systems


Vital Signs


Vitals





 Vital Signs








  Date Time  Temp Pulse Resp B/P Pulse Ox O2 Delivery O2 Flow Rate FiO2


 


5/18/17 08:10 97.4 55 14 92/50 98   


 


5/18/17 00:30      Room Air  


 


5/17/17 10:17        21














 Intake and Output   


 


 5/17/17 5/17/17 5/18/17





 15:00 23:00 07:00


 


Intake Total   480 ml


 


Balance   480 ml











Exam


Constitutional:  alert, oriented


Head:  normocephalic


Neck:  non-tender, supple


Respiratory:  clear to auscultation, normal air movement


Cardiovascular:  nl pulses, regular rate and rhythm


Gastrointestinal:  non-tender, soft


Musculoskeletal:  nl extremities to inspection, nl gait and stance





Results


Result Diagram:  


5/18/17 0445                                                                   

             5/18/17 0445





Results 24 hrs





Laboratory Tests








Test


  5/18/17


04:25 5/18/17


04:45 5/18/17


09:33


 


Total Bilirubin 2.9  H 2.9  H 


 


Direct Bilirubin 2.10  H 2.10  H 


 


Indirect Bilirubin 0.8   0.8   


 


Aspartate Amino Transf


(AST/SGOT) 659  H


  667  H


  


 


 


Alanine Aminotransferase


(ALT/SGPT) 502  H


  501  H


  


 


 


Alkaline Phosphatase 224  H 228  H 


 


Total Protein 5.9  L 6.0  L 


 


Albumin 2.2  L 2.1  L 


 


Amylase Level 125  H  


 


Lipase 104    


 


White Blood Count  5.0   


 


Red Blood Count  3.90  L 


 


Hemoglobin  11.4  L 


 


Hematocrit  32.8  L 


 


Mean Corpuscular Volume  84.1   


 


Mean Corpuscular Hemoglobin  29.2   


 


Mean Corpuscular Hemoglobin


Concent 


  34.8  


  


 


 


Red Cell Distribution Width  15.7  H 


 


Platelet Count  181   


 


Mean Platelet Volume  12.1  H 


 


Neutrophils %  71.9   


 


Lymphocytes %  19.4   


 


Monocytes %  7.9   


 


Eosinophils %  0.2   


 


Basophils %  0.2   


 


Nucleated Red Blood Cells %  0.0   


 


Neutrophils #  3.6   


 


Lymphocytes #  1.0   


 


Monocytes #  0.4   


 


Eosinophils #  0.0   


 


Basophils #  0.0   


 


Nucleated Red Blood Cells #  0.0   


 


Sodium Level  135   


 


Potassium Level  4.9   


 


Chloride Level  101   


 


Carbon Dioxide Level  26   


 


Anion Gap  13   


 


Blood Urea Nitrogen  11   


 


Creatinine  0.58  L 


 


Glucose Level  125   


 


Calcium Level  8.4   


 


Globulin  3.90  H 


 


Albumin/Globulin Ratio  0.53   


 


CA 19-9 Antigen  571.0  H 


 


Prothrombin Time   17.0  H


 


Prothrombin Time Ratio   1.3  


 


INR International Normalized


Ratio 


  


  1.38  


 


 


Activated Partial


Thromboplast Time 


  


  36.4  H


 











Medications


Medications





 Current Medications


Ondansetron HCl (Zofran Inj) 4 mg Q6H  PRN IV NAUSEA AND/OR VOMITING Last 

administered on 5/17/17at 17:09; Admin Dose 4 MG;  Start 5/12/17 at 22:30


Morphine Sulfate (morphine) 2 mg Q4H  PRN IV SEVERE PAIN LEVEL 7-10;  Start 5/12 /17 at 22:30


Famotidine (Pepcid) 20 mg Q12 PO  Last administered on 5/17/17at 21:46; Admin 

Dose 20 MG;  Start 5/13/17 at 09:00


Phytonadione (Vitamin K Soln) 10 mg DAILY PO  Last administered on 5/18/17at 14:

42; Admin Dose 10 MG;  Start 5/18/17 at 13:30;  Stop 5/20/17 at 09:01











BRET SWAIN MD May 18, 2017 15:29

## 2017-05-19 VITALS — SYSTOLIC BLOOD PRESSURE: 91 MMHG | RESPIRATION RATE: 18 BRPM | DIASTOLIC BLOOD PRESSURE: 55 MMHG

## 2017-05-19 VITALS — DIASTOLIC BLOOD PRESSURE: 54 MMHG | HEART RATE: 61 BPM | RESPIRATION RATE: 18 BRPM | SYSTOLIC BLOOD PRESSURE: 91 MMHG

## 2017-05-19 VITALS — RESPIRATION RATE: 18 BRPM | DIASTOLIC BLOOD PRESSURE: 52 MMHG | SYSTOLIC BLOOD PRESSURE: 98 MMHG

## 2017-05-19 VITALS — DIASTOLIC BLOOD PRESSURE: 45 MMHG | SYSTOLIC BLOOD PRESSURE: 83 MMHG | HEART RATE: 71 BPM

## 2017-05-19 VITALS — DIASTOLIC BLOOD PRESSURE: 55 MMHG | RESPIRATION RATE: 18 BRPM | HEART RATE: 63 BPM | SYSTOLIC BLOOD PRESSURE: 94 MMHG

## 2017-05-19 VITALS — HEART RATE: 62 BPM | SYSTOLIC BLOOD PRESSURE: 86 MMHG | DIASTOLIC BLOOD PRESSURE: 44 MMHG

## 2017-05-19 LAB
ADD SCAN DIFF: NO
ALBUMIN SERPL-MCNC: 2.1 G/DL (ref 3.3–4.9)
ALBUMIN/GLOB SERPL: 0.61 {RATIO}
ALP SERPL-CCNC: 201 IU/L (ref 42–121)
ALT SERPL-CCNC: 430 IU/L (ref 13–69)
ANION GAP SERPL CALC-SCNC: 11 MMOL/L (ref 8–16)
APTT BLD: 34.3 SEC (ref 25–35)
AST SERPL-CCNC: 592 IU/L (ref 15–46)
BASOPHILS # BLD AUTO: 0 10^3/UL (ref 0–0.1)
BASOPHILS NFR BLD: 0.4 % (ref 0–2)
BILIRUB DIRECT SERPL-MCNC: 1.8 MG/DL (ref 0–0.2)
BILIRUB SERPL-MCNC: 2.8 MG/DL (ref 0.2–1.3)
BUN SERPL-MCNC: 14 MG/DL (ref 7–20)
CALCIUM SERPL-MCNC: 8 MG/DL (ref 8.4–10.2)
CHLORIDE SERPL-SCNC: 101 MMOL/L (ref 97–110)
CO2 SERPL-SCNC: 30 MMOL/L (ref 21–31)
CREAT SERPL-MCNC: 0.75 MG/DL (ref 0.61–1.24)
EBV ANTIBODY - INTERPRETATION: (no result)
EOSINOPHIL # BLD: 0.3 10^3/UL (ref 0–0.5)
EOSINOPHIL NFR BLD: 6 % (ref 0–7)
ERYTHROCYTE [DISTWIDTH] IN BLOOD BY AUTOMATED COUNT: 15.9 % (ref 11.5–14.5)
GLOBULIN SER-MCNC: 3.4 G/DL (ref 1.3–3.2)
GLUCOSE SERPL-MCNC: 82 MG/DL (ref 70–220)
HCT VFR BLD CALC: 29.1 % (ref 42–52)
HGB BLD-MCNC: 10 G/DL (ref 14–18)
INR PPP: 1.24
LYMPHOCYTES # BLD AUTO: 1.7 10^3/UL (ref 0.8–2.9)
LYMPHOCYTES NFR BLD AUTO: 34.6 % (ref 15–51)
MCH RBC QN AUTO: 28.7 PG (ref 29–33)
MCHC RBC AUTO-ENTMCNC: 34.4 G/DL (ref 32–37)
MCV RBC AUTO: 83.6 FL (ref 82–101)
MONOCYTES # BLD: 0.6 10^3/UL (ref 0.3–0.9)
MONOCYTES NFR BLD: 13 % (ref 0–11)
NEUTROPHILS # BLD: 2.2 10^3/UL (ref 1.6–7.5)
NEUTROPHILS NFR BLD AUTO: 45.6 % (ref 39–77)
NRBC # BLD MANUAL: 0 10^3/UL (ref 0–0)
NRBC BLD QL: 0 /100WBC (ref 0–0)
PLATELET # BLD: 183 10^3/UL (ref 140–415)
PMV BLD AUTO: 12.3 FL (ref 7.4–10.4)
POTASSIUM SERPL-SCNC: 3.8 MMOL/L (ref 3.5–5.1)
PROT SERPL-MCNC: 5.5 G/DL (ref 6.1–8.1)
PROTHROMBIN TIME: 15.7 SEC (ref 12.2–14.2)
PT RATIO: 1.2
RBC # BLD AUTO: 3.48 10^6/UL (ref 4.7–6.1)
SODIUM SERPL-SCNC: 138 MMOL/L (ref 135–144)
WBC # BLD AUTO: 4.9 10^3/UL (ref 4.8–10.8)

## 2017-05-19 PROCEDURE — 0FB03ZX EXCISION OF LIVER, PERCUTANEOUS APPROACH, DIAGNOSTIC: ICD-10-PCS | Performed by: RADIOLOGY

## 2017-05-19 RX ADMIN — FAMOTIDINE SCH MG: 20 TABLET ORAL at 21:00

## 2017-05-19 RX ADMIN — FAMOTIDINE SCH MG: 20 TABLET ORAL at 20:07

## 2017-05-19 RX ADMIN — ALBUTEROL SULFATE SCH PUFF: 90 AEROSOL, METERED RESPIRATORY (INHALATION) at 14:00

## 2017-05-19 RX ADMIN — FOLIC ACID SCH MLS/HR: 5 INJECTION, SOLUTION INTRAMUSCULAR; INTRAVENOUS; SUBCUTANEOUS at 14:18

## 2017-05-19 RX ADMIN — PHYTONADIONE SCH MG: 10 INJECTION, EMULSION INTRAMUSCULAR; INTRAVENOUS; SUBCUTANEOUS at 08:32

## 2017-05-19 RX ADMIN — ALBUTEROL SULFATE SCH PUFF: 90 AEROSOL, METERED RESPIRATORY (INHALATION) at 20:00

## 2017-05-19 RX ADMIN — ALBUTEROL SULFATE SCH PUFF: 90 AEROSOL, METERED RESPIRATORY (INHALATION) at 08:00

## 2017-05-19 RX ADMIN — FAMOTIDINE SCH MG: 20 TABLET ORAL at 09:00

## 2017-05-19 RX ADMIN — ALBUTEROL SULFATE SCH PUFF: 90 AEROSOL, METERED RESPIRATORY (INHALATION) at 02:00

## 2017-05-19 RX ADMIN — DEXAMETHASONE SODIUM PHOSPHATE PRN MG: 10 INJECTION, SOLUTION INTRAMUSCULAR; INTRAVENOUS at 15:48

## 2017-05-19 RX ADMIN — FOLIC ACID SCH MLS/HR: 5 INJECTION, SOLUTION INTRAMUSCULAR; INTRAVENOUS; SUBCUTANEOUS at 19:00

## 2017-05-19 NOTE — CONS
Date/Time of Note


Date/Time of Note


DATE: 5/19/17 


TIME: 09:53





Assessment/Plan


Assessment/Plan


Chief Complaint/Hosp Course


Mr.Eyasu Sagastume is a 22-year-old male with a history of asthma who presented to 

the emergency room for further evaluation of nausea, vomiting, and abdominal 

pain.  Patient denies hematemesis, fever, chills, diarrhea, hematochezia, sick 

contacts, and recent travel outside of the [place].  Patient reports intermittent 

vomiting with chronic nausea and abdominal pain for the last 2 weeks.  He also 

reports losing 4-6 pounds over this period.  He reports previous history of 

abdominal pain for the last year with 50 pound weight loss. About a year ago he 

used to weigh 185 pounds and he intentionally wanted to lose about 20 pounds, 

but he continued to lose weight because of the vomiting and the decreased p.o. 

intake. Patient also has a history of joint pains and swelling for the last 

year as well.  Patient states last travel to [facility] was a year ago.  Patient 

said he was in the process of getting additional workup for autoimmune causes 

of joint pains.  Patient denies diagnosis of lupus.


Problems:  


Additional Assessment/Plan


Transaminitis/jaundice


Nausea and pain control


MRCP: Hepatomegaly is noted.  Hepatic periportal edema is identified, which can 

be seen in the setting of hepatitis. Splenomegaly is noted measuring 14 cm. 

There is mild upper abdominal ascites. Circumferential gallbladder wall 

thickening is noted, likely reactive secondary to underlying liver disease.  No 

evidence of cholelithiasis, cholecystitis or biliary dilatation is identified.


ERCP:  Retained fluid in the stomach (suctioned out.) ? gastroparesis. Normal 

ampulla of Vater.  Normal cholangiogram. 


Monitor LFTs, amylase, lipase


Liver biopsy in process


Smooth muscle, Mitochondrial AB, and REID - Negative





Elevated CA 19-9


Heme/Onc following


Case management consult for OP EUS   





Asthma


Management per Primary





Joint pain


Management per Primary


May require rheumatology consult


Double strand DNA antibody elevated   


   


Further recommendations depend on clinical course


Patient seen in collaboration with Dr. Pritchard





Consultation Date/Type/Reason


Admit Date/Time


May 12, 2017 at 21:48


Type of Consultation:  GI





24 HR Interval Summary


Free Text/Dictation


Pt getting liver biopsy


Request to case management for EUS





Exam/Review of Systems


Vital Signs


Vitals





 Vital Signs








  Date Time  Temp Pulse Resp B/P Pulse Ox O2 Delivery O2 Flow Rate FiO2


 


5/19/17 08:14 97.8 63 18 91/55 96   


 


5/19/17 06:02      Room Air  


 


5/17/17 10:17        21














 Intake and Output   


 


 5/18/17 5/18/17 5/19/17





 15:00 23:00 07:00


 


Intake Total  700 ml 750 ml


 


Output Total   800 ml


 


Balance  700 ml -50 ml











Exam


Constitutional:  alert, oriented


Head:  normocephalic


Neck:  non-tender, supple


Respiratory:  clear to auscultation, normal air movement


Cardiovascular:  nl pulses, regular rate and rhythm


Gastrointestinal:  non-tender, soft


Musculoskeletal:  nl extremities to inspection, nl gait and stance





Results


Result Diagram:  


5/19/17 0418                                                                   

             5/19/17 0418





Results 24 hrs





Laboratory Tests








Test


  5/19/17


04:18


 


White Blood Count 4.9  


 


Red Blood Count 3.48  L


 


Hemoglobin 10.0  L


 


Hematocrit 29.1  L


 


Mean Corpuscular Volume 83.6  


 


Mean Corpuscular Hemoglobin 28.7  L


 


Mean Corpuscular Hemoglobin


Concent 34.4  


 


 


Red Cell Distribution Width 15.9  H


 


Platelet Count 183  


 


Mean Platelet Volume 12.3  H


 


Neutrophils % 45.6  


 


Lymphocytes % 34.6  


 


Monocytes % 13.0  H


 


Eosinophils % 6.0  


 


Basophils % 0.4  


 


Nucleated Red Blood Cells % 0.0  


 


Neutrophils # 2.2  


 


Lymphocytes # 1.7  


 


Monocytes # 0.6  


 


Eosinophils # 0.3  


 


Basophils # 0.0  


 


Nucleated Red Blood Cells # 0.0  


 


Prothrombin Time 15.7  H


 


Prothrombin Time Ratio 1.2  


 


INR International Normalized


Ratio 1.24  


 


 


Activated Partial


Thromboplast Time 34.3  


 


 


Sodium Level 138  


 


Potassium Level 3.8  


 


Chloride Level 101  


 


Carbon Dioxide Level 30  


 


Anion Gap 11  


 


Blood Urea Nitrogen 14  


 


Creatinine 0.75  


 


Glucose Level 82  #


 


Calcium Level 8.0  L


 


Total Bilirubin 2.8  H


 


Direct Bilirubin 1.80  H


 


Indirect Bilirubin 1.0  


 


Aspartate Amino Transf


(AST/SGOT) 592  H


 


 


Alanine Aminotransferase


(ALT/SGPT) 430  H


 


 


Alkaline Phosphatase 201  H


 


Total Protein 5.5  L


 


Albumin 2.1  L


 


Globulin 3.40  H


 


Albumin/Globulin Ratio 0.61  











Medications


Medications





 Current Medications


Ondansetron HCl (Zofran Inj) 4 mg Q6H  PRN IV NAUSEA AND/OR VOMITING Last 

administered on 5/17/17at 17:09; Admin Dose 4 MG;  Start 5/12/17 at 22:30


Morphine Sulfate (morphine) 2 mg Q4H  PRN IV SEVERE PAIN LEVEL 7-10;  Start 5/12 /17 at 22:30


Famotidine (Pepcid) 20 mg Q12 PO  Last administered on 5/18/17at 20:32; Admin 

Dose 20 MG;  Start 5/13/17 at 09:00


Phytonadione 10 mg 10 mg DAILY PO  Last administered on 5/19/17at 08:32; Admin 

Dose 10 MG;  Start 5/18/17 at 13:30;  Stop 5/20/17 at 09:01


Sodium Chloride (NS) 1,000 ml @  70 mls/hr [id number] IV  Last administered on 5/18 /17at 23:39; Admin Dose 70 MLS/HR;  Start 5/19/17 at 00:00











LARS CASTRO May 19, 2017 10:02

## 2017-05-19 NOTE — PN
Date/Time of Note


Date/Time of Note


DATE: 5/19/17 


TIME: 22:45





Assessment/Plan


VTE Prophylaxis


VTE Prophylaxis Intervention:  SCD's





Lines/Catheters


IV Catheter Type (from [facility]):  Saline Lock


Urinary Cath still in place:  No





Assessment/Plan


Assessment/Plan


1.  Hyperbilirubinemia with transaminitis, possibly autoimmune hepatitis.  

Hepatitis panel negative.  S/p US guided biopsy 5/19/17


2.  Positive tumor markers.  The patient has positive CA 19-9 and CEA.  Also, 

the patient's alpha fetoprotein is negative.  We will involve oncology on the 

case.


3.  Polyarthritis with decreased range of motion.  Etiology unclear.


4.  Asthma.  Stable.  Continue p.r.n. inhaled bronchodilators.


5.  Normocytic anemia.  Etiology unclear.  We will monitor the H and H closely.

  We will order an iron panel.


 Deep venous thrombosis prophylaxis.  Ambulation.


 Gastrointestinal prophylaxis.  Histamine 2 receptor blockers.


 


PLAN:  Continue inpatient monitoring.  S/p BIopsy todya, report pending, LFTs 

slightly better but it is still very high


H & O and GI has been following 


will follow up





Subjective


24 Hr Interval Summary


Free Text/Dictation


S/p US guided biopsy no bleeding, pain controlled, LFTs slightly better





Exam/Review of Systems


Vital Signs


Vitals





 Vital Signs








  Date Time  Temp Pulse Resp B/P Pulse Ox O2 Delivery O2 Flow Rate FiO2


 


5/19/17 19:00 97.9 86 18 98/52 97   


 


5/19/17 10:20      Room Air  


 


5/17/17 10:17        21














 Intake and Output   


 


 5/18/17 5/18/17 5/19/17





 15:00 23:00 07:00


 


Intake Total  700 ml 750 ml


 


Output Total   800 ml


 


Balance  700 ml -50 ml











Exam


General: The patient is well-developed, Not  in acute distress.


HEENT: Conjunctivae is icteric, normocephalic. The pupils are equal and round .


 Neck: Supple with full range of motion. 


Chest: Normal expansion of the thorax during inspiration


Lungs: Clear to auscultation bilaterally


Heart: Normal S1-S2, Regular rhythm and rate.


Abdomen: Soft , nontender,  nondistended , bowel sounds are present.


Extremities: Normal to inspection, no edema no cyanosis


Neurologic: Normal mental status,The patient is awake,  alert and oriented .





Results


Result Diagram:  


5/19/17 0418                                                                   

             5/19/17 0418





Results 24 hrs





Laboratory Tests








Test


  5/19/17


04:18


 


White Blood Count 4.9  


 


Red Blood Count 3.48  L


 


Hemoglobin 10.0  L


 


Hematocrit 29.1  L


 


Mean Corpuscular Volume 83.6  


 


Mean Corpuscular Hemoglobin 28.7  L


 


Mean Corpuscular Hemoglobin


Concent 34.4  


 


 


Red Cell Distribution Width 15.9  H


 


Platelet Count 183  


 


Mean Platelet Volume 12.3  H


 


Neutrophils % 45.6  


 


Lymphocytes % 34.6  


 


Monocytes % 13.0  H


 


Eosinophils % 6.0  


 


Basophils % 0.4  


 


Nucleated Red Blood Cells % 0.0  


 


Neutrophils # 2.2  


 


Lymphocytes # 1.7  


 


Monocytes # 0.6  


 


Eosinophils # 0.3  


 


Basophils # 0.0  


 


Nucleated Red Blood Cells # 0.0  


 


Prothrombin Time 15.7  H


 


Prothrombin Time Ratio 1.2  


 


INR International Normalized


Ratio 1.24  


 


 


Activated Partial


Thromboplast Time 34.3  


 


 


Sodium Level 138  


 


Potassium Level 3.8  


 


Chloride Level 101  


 


Carbon Dioxide Level 30  


 


Anion Gap 11  


 


Blood Urea Nitrogen 14  


 


Creatinine 0.75  


 


Glucose Level 82  #


 


Calcium Level 8.0  L


 


Total Bilirubin 2.8  H


 


Direct Bilirubin 1.80  H


 


Indirect Bilirubin 1.0  


 


Aspartate Amino Transf


(AST/SGOT) 592  H


 


 


Alanine Aminotransferase


(ALT/SGPT) 430  H


 


 


Alkaline Phosphatase 201  H


 


Total Protein 5.5  L


 


Albumin 2.1  L


 


Globulin 3.40  H


 


Albumin/Globulin Ratio 0.61  











Medications


Medications





 Current Medications


Ondansetron HCl (Zofran Inj) 4 mg Q6H  PRN IV NAUSEA AND/OR VOMITING Last 

administered on 5/19/17at 15:48; Admin Dose 4 MG;  Start 5/12/17 at 22:30


Morphine Sulfate (morphine) 2 mg Q4H  PRN IV SEVERE PAIN LEVEL 7-10 Last 

administered on 5/19/17at 15:46; Admin Dose 2 MG;  Start 5/12/17 at 22:30


Famotidine (Pepcid) 20 mg Q12 PO  Last administered on 5/19/17at 20:07; Admin 

Dose 20 MG;  Start 5/13/17 at 09:00


Phytonadione 10 mg 10 mg DAILY PO  Last administered on 5/19/17at 08:32; Admin 

Dose 10 MG;  Start 5/18/17 at 13:30;  Stop 5/20/17 at 09:01


Sodium Chloride (NS) 1,000 ml @  70 mls/hr [id number] IV  Last administered on 5/18 /17at 23:39; Admin Dose 70 MLS/HR;  Start 5/19/17 at 00:00











ZACK GALLEGO MD May 19, 2017 22:47

## 2017-05-19 NOTE — CONS
Date/Time of Note


Date/Time of Note


DATE: 5/19/17 


TIME: 09:37





Assessment/Plan


Assessment/Plan


Chief Complaint/Hosp Course


The patient is a 22 year old male with hyperbilirubinemia, trending down since 

admission, of unclear etiology with negative hepatitis panel, undergoing work-

up for autoimmune hepatitis, with significantly elevated CA 19-9 at 869 (0-37) 

and CEA mildly elevated at 7.2 (0-5).  AFP normal at 2.77 (0-7.21).  Repeat CA 

19-9 decreased to 571 on 5/18/17.





- MRCP 5/13/17 demonstrated hepatomegaly at 20 cm with no focal hepatic mass is 

identified, hepatic periportal edema is noted, which can be seen in the setting 

of hepatitis.  Gallbladder is contracted, and demonstrates circumferential wall 

edema, likely reactive secondary to underlying liver disease.  Splenomegaly is 

noted measuring 14 cm.  Biliary tree, pancreas, adrenal glands and kidneys are 

unremarkable.  There is no retroperitoneal or lacy hepatis lymphadenopathy.  

Mild upper abdominal ascites is noted.  


- Appreciate GI recs.  s/p ERCP 5/7/17 that showed no filling defects in CBD, ?

gastroparesis for which Reglan was added.


- Will consider outpatient EUS to rule out pancreaticobiliary lesion. CA 19-9 

is significantly elevated and may be due to underlying inflammatory/autoimmune 

process, however would need to rule out underlying pancreaticobiliary 

malignancy such as pancreatic or intrahepatic cholangiocarcinoma.  Patient can 

follow-up with us as an outpatient for EUS/GI referral.


- Pending liver biopsy today, cancelled yesterday due to elevated coags, 

receiving Vitamin K and FFP with coags improved





# Normocytic anemia, stable, iron panel consistent with anemia of chronic 

inflammation, B12/folate/TSH WNL, retic count inappropriately low, LDH 

elevated. Pending MMA, homocysteine.  Haptoglobin was < 15, which may be 

secondary to liver disease, however will check peripheral smear to rule out 

schistocytes or spherocytes.  Ashley' test negative. 





# Polyarthritis, REID negative, anti-mitochondrial Ab neg, smooth muscle Ab 

negative, ds DNA elevated at 384.  Rheumatology follow-up.


Problems:  





Consultation Date/Type/Reason


Admit Date/Time


May 12, 2017 at 21:48


Initial Consult Date


5/15/17


Type of Consultation:  Oncology





24 HR Interval Summary


Free Text/Dictation


Patient taken for liver biopsy this morning.





Exam/Review of Systems


Vital Signs


Vitals





 Vital Signs








  Date Time  Temp Pulse Resp B/P Pulse Ox O2 Delivery O2 Flow Rate FiO2


 


5/19/17 08:14 97.8 63 18 91/55 96   


 


5/19/17 06:02      Room Air  


 


5/17/17 10:17        21














 Intake and Output   


 


 5/18/17 5/18/17 5/19/17





 15:00 23:00 07:00


 


Intake Total  700 ml 750 ml


 


Output Total   800 ml


 


Balance  700 ml -50 ml











Exam


Constitutional:  alert, oriented


Psych:  no complaints


Eyes:  icteric


Neck:  supple


Respiratory:  clear to auscultation


Cardiovascular:  regular rate and rhythm


Gastrointestinal:  non-tender, soft


Musculoskeletal:  nl extremities to inspection


Neurological:  CNS II-XII intact





Results


Result Diagram:  


5/19/17 0418                                                                   

             5/19/17 0418





Results 24 hrs





Laboratory Tests








Test


  5/19/17


04:18


 


White Blood Count 4.9  


 


Red Blood Count 3.48  L


 


Hemoglobin 10.0  L


 


Hematocrit 29.1  L


 


Mean Corpuscular Volume 83.6  


 


Mean Corpuscular Hemoglobin 28.7  L


 


Mean Corpuscular Hemoglobin


Concent 34.4  


 


 


Red Cell Distribution Width 15.9  H


 


Platelet Count 183  


 


Mean Platelet Volume 12.3  H


 


Neutrophils % 45.6  


 


Lymphocytes % 34.6  


 


Monocytes % 13.0  H


 


Eosinophils % 6.0  


 


Basophils % 0.4  


 


Nucleated Red Blood Cells % 0.0  


 


Neutrophils # 2.2  


 


Lymphocytes # 1.7  


 


Monocytes # 0.6  


 


Eosinophils # 0.3  


 


Basophils # 0.0  


 


Nucleated Red Blood Cells # 0.0  


 


Prothrombin Time 15.7  H


 


Prothrombin Time Ratio 1.2  


 


INR International Normalized


Ratio 1.24  


 


 


Activated Partial


Thromboplast Time 34.3  


 


 


Sodium Level 138  


 


Potassium Level 3.8  


 


Chloride Level 101  


 


Carbon Dioxide Level 30  


 


Anion Gap 11  


 


Blood Urea Nitrogen 14  


 


Creatinine 0.75  


 


Glucose Level 82  #


 


Calcium Level 8.0  L


 


Total Bilirubin 2.8  H


 


Direct Bilirubin 1.80  H


 


Indirect Bilirubin 1.0  


 


Aspartate Amino Transf


(AST/SGOT) 592  H


 


 


Alanine Aminotransferase


(ALT/SGPT) 430  H


 


 


Alkaline Phosphatase 201  H


 


Total Protein 5.5  L


 


Albumin 2.1  L


 


Globulin 3.40  H


 


Albumin/Globulin Ratio 0.61  











Medications


Medications





 Current Medications


Ondansetron HCl (Zofran Inj) 4 mg Q6H  PRN IV NAUSEA AND/OR VOMITING Last 

administered on 5/17/17at 17:09; Admin Dose 4 MG;  Start 5/12/17 at 22:30


Morphine Sulfate (morphine) 2 mg Q4H  PRN IV SEVERE PAIN LEVEL 7-10;  Start 5/12 /17 at 22:30


Famotidine (Pepcid) 20 mg Q12 PO  Last administered on 5/18/17at 20:32; Admin 

Dose 20 MG;  Start 5/13/17 at 09:00


Phytonadione 10 mg 10 mg DAILY PO  Last administered on 5/19/17at 08:32; Admin 

Dose 10 MG;  Start 5/18/17 at 13:30;  Stop 5/20/17 at 09:01


Sodium Chloride (NS) 1,000 ml @  70 mls/hr [id number] IV  Last administered on 5/18 /17at 23:39; Admin Dose 70 MLS/HR;  Start 5/19/17 at 00:00











ALCIDES LOONEY MD May 19, 2017 09:38

## 2017-05-19 NOTE — RADRPT
PROCEDURE:   Ultrasound guided liver biopsy.  

 

CLINICAL INDICATION:   Hepatitis. 

 

TECHNIQUE:   

Informed consent was obtained. The procedure, risks, benefits, complications and alternatives  were 
explained to the patient. Risks including bleeding and infection were explained. The  patient unders
tood and was willing to proceed.

A procedural pause was performed. The patient's name, date of birth, and procedure to be performed w
ere  verified.

 

Using local anesthetic, sterile technique and ultrasound guidance, an 18-gauge automated core biopsy
 needle was used to biopsy the right hepatic lobe. A single pass was made.  Adequate tissue was obta
ined. 

A dressing was applied. The  patient tolerated procedure well.

 

COMPARISON:   None. 

 

FINDINGS:

Ultrasound imaging demonstrates the liver to be 1.2 cm deep to the skin.

 

IMPRESSION:

1. Successful ultrasound guided liver biopsy.

 

RPTAT: QQ

_____________________________________________ 

.Dinesh Hidalgo MD, MD           Date    Time 

Electronically viewed and signed by .Dinesh Hidalgo MD, MD on 05/19/2017 14:15 

 

D:  05/19/2017 14:15  T:  05/19/2017 14:15

.R/

## 2017-05-20 VITALS — SYSTOLIC BLOOD PRESSURE: 90 MMHG | RESPIRATION RATE: 17 BRPM | DIASTOLIC BLOOD PRESSURE: 52 MMHG | HEART RATE: 66 BPM

## 2017-05-20 VITALS — HEART RATE: 80 BPM | SYSTOLIC BLOOD PRESSURE: 91 MMHG | DIASTOLIC BLOOD PRESSURE: 50 MMHG

## 2017-05-20 VITALS — DIASTOLIC BLOOD PRESSURE: 51 MMHG | RESPIRATION RATE: 18 BRPM | SYSTOLIC BLOOD PRESSURE: 92 MMHG

## 2017-05-20 VITALS — RESPIRATION RATE: 17 BRPM | HEART RATE: 63 BPM | DIASTOLIC BLOOD PRESSURE: 43 MMHG | SYSTOLIC BLOOD PRESSURE: 86 MMHG

## 2017-05-20 VITALS — RESPIRATION RATE: 18 BRPM | SYSTOLIC BLOOD PRESSURE: 89 MMHG | DIASTOLIC BLOOD PRESSURE: 49 MMHG

## 2017-05-20 RX ADMIN — ALBUTEROL SULFATE SCH PUFF: 90 AEROSOL, METERED RESPIRATORY (INHALATION) at 14:00

## 2017-05-20 RX ADMIN — FOLIC ACID SCH MLS/HR: 5 INJECTION, SOLUTION INTRAMUSCULAR; INTRAVENOUS; SUBCUTANEOUS at 18:54

## 2017-05-20 RX ADMIN — ALBUTEROL SULFATE SCH PUFF: 90 AEROSOL, METERED RESPIRATORY (INHALATION) at 02:00

## 2017-05-20 RX ADMIN — PHYTONADIONE SCH MG: 10 INJECTION, EMULSION INTRAMUSCULAR; INTRAVENOUS; SUBCUTANEOUS at 10:06

## 2017-05-20 RX ADMIN — ALBUTEROL SULFATE SCH PUFF: 90 AEROSOL, METERED RESPIRATORY (INHALATION) at 20:00

## 2017-05-20 RX ADMIN — ALBUTEROL SULFATE SCH PUFF: 90 AEROSOL, METERED RESPIRATORY (INHALATION) at 08:00

## 2017-05-20 RX ADMIN — FAMOTIDINE SCH MG: 20 TABLET ORAL at 08:53

## 2017-05-20 RX ADMIN — FOLIC ACID SCH MLS/HR: 5 INJECTION, SOLUTION INTRAMUSCULAR; INTRAVENOUS; SUBCUTANEOUS at 20:31

## 2017-05-20 RX ADMIN — FAMOTIDINE SCH MG: 20 TABLET ORAL at 21:50

## 2017-05-20 RX ADMIN — FOLIC ACID SCH MLS/HR: 5 INJECTION, SOLUTION INTRAMUSCULAR; INTRAVENOUS; SUBCUTANEOUS at 03:59

## 2017-05-20 NOTE — PN
Date/Time of Note


Date/Time of Note


DATE: 5/20/17 


TIME: 14:08





Assessment/Plan


VTE Prophylaxis


VTE Prophylaxis Intervention:  SCD's





Lines/Catheters


IV Catheter Type (from [facility]):  Peripheral IV


Urinary Cath still in place:  No





Assessment/Plan


Assessment/Plan


1.  Hyperbilirubinemia with transaminitis, possibly autoimmune hepatitis.  

Hepatitis panel negative.  S/p US guided biopsy 5/19/17


2.  Positive tumor markers.  The patient has positive CA 19-9 and CEA.  Also, 

the patient's alpha fetoprotein is negative.  We will involve oncology on the 

case.


3.  Polyarthritis with decreased range of motion.  Etiology unclear.


4.  Asthma.  Stable.  Continue p.r.n. inhaled bronchodilators.


5.  Normocytic anemia.  Etiology unclear.  We will monitor the H and H closely.

  We will order an iron panel.


 Deep venous thrombosis prophylaxis.  Ambulation.


 Gastrointestinal prophylaxis.  Histamine 2 receptor blockers.


 


PLAN:  Continue inpatient monitoring.  S/p BIopsy todya, report pending, LFTs 

slightly better but it is still very high


H & O and GI has been following 


will follow up





Subjective


24 Hr Interval Summary


Free Text/Dictation


s/p Liver biopsy , no LFT checked today, pt is stable





Exam/Review of Systems


Vital Signs


Vitals





 Vital Signs








  Date Time  Temp Pulse Resp B/P Pulse Ox O2 Delivery O2 Flow Rate FiO2


 


5/20/17 08:49  80  91/50    


 


5/20/17 08:43 98.3  18  97   


 


5/20/17 04:00      Room Air  


 


5/17/17 10:17        21














 Intake and Output   


 


 5/19/17 5/19/17 5/20/17





 15:00 23:00 07:00


 


Intake Total  1490 ml 1250 ml


 


Balance  1490 ml 1250 ml











Exam


General: The patient is well-developed, Not  in acute distress.


HEENT: Conjunctivae is icteric, normocephalic. The pupils are equal and round .


 Neck: Supple with full range of motion. 


Chest: Normal expansion of the thorax during inspiration


Lungs: Clear to auscultation bilaterally


Heart: Normal S1-S2, Regular rhythm and rate.


Abdomen: Soft , nontender,  nondistended , bowel sounds are present.


Extremities: Normal to inspection, no edema no cyanosis


Neurologic: Normal mental status,The patient is awake,  alert and oriented .





Results


Result Diagram:  


5/19/17 0418                                                                   

             5/19/17 0418








Medications


Medications





 Current Medications


Ondansetron HCl (Zofran Inj) 4 mg Q6H  PRN IV NAUSEA AND/OR VOMITING Last 

administered on 5/19/17at 15:48; Admin Dose 4 MG;  Start 5/12/17 at 22:30


Morphine Sulfate (morphine) 2 mg Q4H  PRN IV SEVERE PAIN LEVEL 7-10 Last 

administered on 5/19/17at 15:46; Admin Dose 2 MG;  Start 5/12/17 at 22:30


Famotidine 20 mg 20 mg Q12 PO  Last administered on 5/20/17at 08:53; Admin Dose 

20 MG;  Start 5/13/17 at 09:00


Sodium Chloride (NS) 1,000 ml @  70 mls/hr [id number] IV  Last administered on 5/20 /17at 03:59; Admin Dose 70 MLS/HR;  Start 5/19/17 at 00:00











ZACK GALLEGO MD May 20, 2017 14:10

## 2017-05-20 NOTE — PN
Date/Time of Note


Date/Time of Note


DATE: 5/20/17 


TIME: 14:07





Assessment/Plan


VTE Prophylaxis


VTE Prophylaxis Intervention:  ambulation





Lines/Catheters


IV Catheter Type (from [facility]):  Peripheral IV


Urinary Cath still in place:  No





Assessment/Plan


Assessment/Plan


Jaundice/Transaminitis


               Hepatocellular consider autoimmune hepatitis 


                  ERCP 5/17/2017 


                          Retained fluid in the stomach (suctioned out.)


                          ? gastroparesis.


                           Normal ampulla of Vater.  


                        .  Normal cholangiogram. 


                  MRI abdomen


                     Hepatomegaly is noted.  Hepatic periportal edema is 

identified, which can be seen in the setting of hepatitis.


                     Splenomegaly is noted measuring 14 cm.


                .  There is mild upper abdominal ascites.


               .  Circumferential gallbladder wall thickening is noted, likely 

reactive secondary to underlying liver disease.  No evidence of cholelithiasis, 

cholecystitis or biliary dilatation is identified.


                  There is mild anasarca.


* Elevated Ca 19-9


* Hx of asthma





Plan


* awaiting biopsy result


* continue present medications





Subjective


24 Hr Interval Summary


Free Text/Dictation


* Course reviewed with RN


* Patient seen and examined


* still awaiting biopsy results





Exam/Review of Systems


Vital Signs


Vitals





 Vital Signs








  Date Time  Temp Pulse Resp B/P Pulse Ox O2 Delivery O2 Flow Rate FiO2


 


5/20/17 08:49  80  91/50    


 


5/20/17 08:43 98.3  18  97   


 


5/20/17 04:00      Room Air  


 


5/17/17 10:17        21














 Intake and Output   


 


 5/19/17 5/19/17 5/20/17





 15:00 23:00 07:00


 


Intake Total  1490 ml 1250 ml


 


Balance  1490 ml 1250 ml











Exam


Constitutional:  alert, oriented


Head:  normocephalic


Neck:  supple


Respiratory:  clear to auscultation, normal air movement


Cardiovascular:  nl pulses, regular rate and rhythm


Gastrointestinal:  non-tender, soft


Musculoskeletal:  nl extremities to inspection, nl gait and stance





Results


Result Diagram:  


5/19/17 0418                                                                   

             5/19/17 0418








Medications


Medications





 Current Medications


Ondansetron HCl (Zofran Inj) 4 mg Q6H  PRN IV NAUSEA AND/OR VOMITING Last 

administered on 5/19/17at 15:48; Admin Dose 4 MG;  Start 5/12/17 at 22:30


Morphine Sulfate (morphine) 2 mg Q4H  PRN IV SEVERE PAIN LEVEL 7-10 Last 

administered on 5/19/17at 15:46; Admin Dose 2 MG;  Start 5/12/17 at 22:30


Famotidine 20 mg 20 mg Q12 PO  Last administered on 5/20/17at 08:53; Admin Dose 

20 MG;  Start 5/13/17 at 09:00


Sodium Chloride (NS) 1,000 ml @  70 mls/hr [id number] IV  Last administered on 5/20 /17at 03:59; Admin Dose 70 MLS/HR;  Start 5/19/17 at 00:00











BRET SWAIN MD May 20, 2017 14:14

## 2017-05-21 VITALS — HEART RATE: 65 BPM | DIASTOLIC BLOOD PRESSURE: 56 MMHG | SYSTOLIC BLOOD PRESSURE: 97 MMHG | RESPIRATION RATE: 18 BRPM

## 2017-05-21 VITALS — SYSTOLIC BLOOD PRESSURE: 87 MMHG | DIASTOLIC BLOOD PRESSURE: 50 MMHG | RESPIRATION RATE: 19 BRPM

## 2017-05-21 LAB
ALBUMIN SERPL-MCNC: 2.2 G/DL (ref 3.3–4.9)
ALBUMIN/GLOB SERPL: 0.57 {RATIO}
ALP SERPL-CCNC: 225 IU/L (ref 42–121)
ALT SERPL-CCNC: 440 IU/L (ref 13–69)
ANION GAP SERPL CALC-SCNC: 11 MMOL/L (ref 8–16)
AST SERPL-CCNC: 589 IU/L (ref 15–46)
BILIRUB DIRECT SERPL-MCNC: 1.2 MG/DL (ref 0–0.2)
BILIRUB SERPL-MCNC: 2.1 MG/DL (ref 0.2–1.3)
BUN SERPL-MCNC: 7 MG/DL (ref 7–20)
CALCIUM SERPL-MCNC: 8.1 MG/DL (ref 8.4–10.2)
CHLORIDE SERPL-SCNC: 103 MMOL/L (ref 97–110)
CO2 SERPL-SCNC: 27 MMOL/L (ref 21–31)
CREAT SERPL-MCNC: 0.6 MG/DL (ref 0.61–1.24)
GLOBULIN SER-MCNC: 3.8 G/DL (ref 1.3–3.2)
GLUCOSE SERPL-MCNC: 86 MG/DL (ref 70–220)
POTASSIUM SERPL-SCNC: 3.8 MMOL/L (ref 3.5–5.1)
PROT SERPL-MCNC: 6 G/DL (ref 6.1–8.1)
SODIUM SERPL-SCNC: 137 MMOL/L (ref 135–144)

## 2017-05-21 RX ADMIN — FAMOTIDINE SCH MG: 20 TABLET ORAL at 10:41

## 2017-05-21 RX ADMIN — ALBUTEROL SULFATE SCH PUFF: 90 AEROSOL, METERED RESPIRATORY (INHALATION) at 08:00

## 2017-05-21 RX ADMIN — ALBUTEROL SULFATE SCH PUFF: 90 AEROSOL, METERED RESPIRATORY (INHALATION) at 02:00

## 2017-05-21 RX ADMIN — FOLIC ACID SCH MLS/HR: 5 INJECTION, SOLUTION INTRAMUSCULAR; INTRAVENOUS; SUBCUTANEOUS at 23:30

## 2017-05-21 RX ADMIN — FAMOTIDINE SCH MG: 20 TABLET ORAL at 20:20

## 2017-05-21 NOTE — CONS
Date/Time of Note


Date/Time of Note


DATE: 5/21/17 


TIME: 11:19





Assessment/Plan


Assessment/Plan


Additional Assessment/Plan


Assessment and plan; next





1.  Patient admitted with acute hepatitis status post liver biopsy.  Awaiting 

results.


2.  Stable clinical status.  Next





Continue current treatment.  Further workup to be determined once pathology 

results are obtained.





Consultation Date/Type/Reason


Admit Date/Time


May 12, 2017 at 21:48


Initial Consult Date


5/15/17


Type of Consultation:  Internal medicine





24 HR Interval Summary


Free Text/Dictation


Patient condition stable.  He has been ambulating in the hospital.  Denies any 

abdominal pain nausea vomiting.





Angela; young male, awake alert currently in no distress.





Exam/Review of Systems


Vital Signs


Vitals





 Vital Signs








  Date Time  Temp Pulse Resp B/P Pulse Ox O2 Delivery O2 Flow Rate FiO2


 


5/21/17 07:33 98.0 64 19 87/50 98   


 


5/20/17 04:00      Room Air  


 


5/17/17 10:17        21














 Intake and Output   


 


 5/20/17 5/20/17 5/21/17





 15:00 23:00 07:00


 


Intake Total  2140 ml 550 ml


 


Output Total  1100 ml 300 ml


 


Balance  1040 ml 250 ml











Exam


HEENT exam; supple neck, no JVD.  No lymphadenopathy midline trachea.  No 

thyromegaly.  Patient is nonicteric.  





Chest examination; clear to ulceration.  S1-S2 audible, no murmurs.  Regular 

rhythm.





Abdomen exam is; soft, nondistended.  No organomegaly.  Bowel is audible.





Extremity examination; no peripheral edema.  





CNS examination; no focal deficit.





Results


Result Diagram:  


5/19/17 0418                                                                   

             5/21/17 0839





Results 24 hrs





Laboratory Tests








Test


  5/21/17


08:39


 


Sodium Level 137  


 


Potassium Level 3.8  


 


Chloride Level 103  


 


Carbon Dioxide Level 27  


 


Anion Gap 11  


 


Blood Urea Nitrogen 7  


 


Creatinine 0.60  L


 


Glucose Level 86  


 


Calcium Level 8.1  L


 


Total Bilirubin 2.1  H


 


Direct Bilirubin 1.20  H


 


Indirect Bilirubin 0.9  


 


Aspartate Amino Transf


(AST/SGOT) 589  H


 


 


Alanine Aminotransferase


(ALT/SGPT) 440  H


 


 


Alkaline Phosphatase 225  H


 


Total Protein 6.0  L


 


Albumin 2.2  L


 


Globulin 3.80  H


 


Albumin/Globulin Ratio 0.57  











Medications


Medications





 Current Medications


Ondansetron HCl (Zofran Inj) 4 mg Q6H  PRN IV NAUSEA AND/OR VOMITING Last 

administered on 5/19/17at 15:48; Admin Dose 4 MG;  Start 5/12/17 at 22:30


Morphine Sulfate (morphine) 2 mg Q4H  PRN IV SEVERE PAIN LEVEL 7-10 Last 

administered on 5/19/17at 15:46; Admin Dose 2 MG;  Start 5/12/17 at 22:30


Famotidine 20 mg 20 mg Q12 PO  Last administered on 5/21/17at 10:41; Admin Dose 

20 MG;  Start 5/13/17 at 09:00


Sodium Chloride (NS) 1,000 ml @  70 mls/hr [id number] IV  Last administered on 5/20 /17at 20:31; Admin Dose 70 MLS/HR;  Start 5/19/17 at 00:00











ERIN SÁNCHEZ May 21, 2017 11:21

## 2017-05-21 NOTE — PN
Date/Time of Note


Date/Time of Note


DATE: 5/21/17 


TIME: 11:59





Assessment/Plan


VTE Prophylaxis


VTE Prophylaxis Intervention:  ambulation





Lines/Catheters


[facility] C[facility]ter Type (from [facility]):  Saline Lock


Urinary Cath still in place:  No





Assessment/Plan


Assessment/Plan


Jaundice/Transaminitis


               Hepatocellular consider autoimmune hepatitis 


                  ERCP 5/17/2017 


                          Retained fluid in the stomach (suctioned out.)


                          ? gastroparesis.


                           Normal ampulla of Vater.  


                        .  Normal cholangiogram. 


                  MRI abdomen


                     Hepatomegaly is noted.  Hepatic periportal edema is 

identified, which can be seen in the setting of hepatitis.


                     Splenomegaly is noted measuring 14 cm.


                .  There is mild upper abdominal ascites.


               .  Circumferential gallbladder wall thickening is noted, likely 

reactive secondary to underlying liver disease.  No evidence of cholelithiasis, 

cholecystitis or 


                    biliary dilatation is identified.


                  There is mild anasarca.


* Elevated Ca 19-9


* Hx of asthma





Plan


* awaiting biopsy result


* continue present medications





Subjective


24 Hr Interval Summary


Free Text/Dictation


* course reviewed with RN


* Patient seen and examined


* awaiting biopsy results





Exam/Review of Systems


Vital Signs


Vitals





 Vital Signs








  Date Time  Temp Pulse Resp B/P Pulse Ox O2 Delivery O2 Flow Rate FiO2


 


5/21/17 07:33 98.0 64 19 87/50 98   


 


5/20/17 04:00      Room Air  


 


5/17/17 10:17        21














 Intake and Output   


 


 5/20/17 5/20/17 5/21/17





 15:00 23:00 07:00


 


Intake Total  2140 ml 550 ml


 


Output Total  1100 ml 300 ml


 


Balance  1040 ml 250 ml











Exam


Constitutional:  alert, oriented


Psych:  no complaints


Head:  atraumatic, normocephalic


Eyes:  PERRL, icteric


Neck:  non-tender, supple


Respiratory:  clear to auscultation, normal air movement


Cardiovascular:  nl pulses, regular rate and rhythm


Gastrointestinal:  non-tender, soft


Musculoskeletal:  nl extremities to inspection, nl gait and stance


Neurological:  nl mental status, nl speech, nl strength





Results


Result Diagram:  


5/19/17 0418                                                                   

             5/21/17 0839





Results 24 hrs





Laboratory Tests








Test


  5/21/17


08:39


 


Sodium Level 137  


 


Potassium Level 3.8  


 


Chloride Level 103  


 


Carbon Dioxide Level 27  


 


Anion Gap 11  


 


Blood Urea Nitrogen 7  


 


Creatinine 0.60  L


 


Glucose Level 86  


 


Calcium Level 8.1  L


 


Total Bilirubin 2.1  H


 


Direct Bilirubin 1.20  H


 


Indirect Bilirubin 0.9  


 


Aspartate Amino Transf


(AST/SGOT) 589  H


 


 


Alanine Aminotransferase


(ALT/SGPT) 440  H


 


 


Alkaline Phosphatase 225  H


 


Total Protein 6.0  L


 


Albumin 2.2  L


 


Globulin 3.80  H


 


Albumin/Globulin Ratio 0.57  











Medications


Medications





 Current Medications


Ondansetron HCl (Zofran Inj) 4 mg Q6H  PRN IV NAUSEA AND/OR VOMITING Last 

administered on 5/19/17at 15:48; Admin Dose 4 MG;  Start 5/12/17 at 22:30


Morphine Sulfate (morphine) 2 mg Q4H  PRN IV SEVERE PAIN LEVEL 7-10 Last 

administered on 5/19/17at 15:46; Admin Dose 2 MG;  Start 5/12/17 at 22:30


Famotidine 20 mg 20 mg Q12 PO  Last administered on 5/21/17at 10:41; Admin Dose 

20 MG;  Start 5/13/17 at 09:00


Sodium Chloride (NS) 1,000 ml @  70 mls/hr [id number] IV  Last administered on 5/20 /17at 20:31; Admin Dose 70 MLS/HR;  Start 5/19/17 at 00:00











BRET SWAIN MD May 21, 2017 12:01

## 2017-05-22 VITALS — RESPIRATION RATE: 19 BRPM | DIASTOLIC BLOOD PRESSURE: 47 MMHG | SYSTOLIC BLOOD PRESSURE: 83 MMHG

## 2017-05-22 VITALS — SYSTOLIC BLOOD PRESSURE: 91 MMHG | RESPIRATION RATE: 16 BRPM | DIASTOLIC BLOOD PRESSURE: 52 MMHG

## 2017-05-22 RX ADMIN — FAMOTIDINE SCH MG: 20 TABLET ORAL at 08:53

## 2017-05-22 RX ADMIN — FOLIC ACID SCH MLS/HR: 5 INJECTION, SOLUTION INTRAMUSCULAR; INTRAVENOUS; SUBCUTANEOUS at 20:48

## 2017-05-22 RX ADMIN — FOLIC ACID SCH MLS/HR: 5 INJECTION, SOLUTION INTRAMUSCULAR; INTRAVENOUS; SUBCUTANEOUS at 12:50

## 2017-05-22 RX ADMIN — FAMOTIDINE SCH MG: 20 TABLET ORAL at 20:47

## 2017-05-22 NOTE — PN
Date/Time of Note


Date/Time of Note


DATE: 5/22/17 


TIME: 16:07





Assessment/Plan


VTE Prophylaxis


VTE Prophylaxis Intervention:  SCD's





Lines/Catheters


IV Catheter Type (from [facility]):  Saline Lock


Urinary Cath still in place:  No





Assessment/Plan


Assessment/Plan


Jaundice/Transaminitis


               Hepatocellular consider autoimmune hepatitis 


                  ERCP 5/17/2017 


                          Retained fluid in the stomach (suctioned out.)


                          ? gastroparesis.


                           Normal ampulla of Vater.  


                        .  Normal cholangiogram. 


                  MRI abdomen


                     Hepatomegaly is noted.  Hepatic periportal edema is 

identified, which can be seen in the setting of hepatitis.


                     Splenomegaly is noted measuring 14 cm.


                .  There is mild upper abdominal ascites.


               .  Circumferential gallbladder wall thickening is noted, likely 

reactive secondary to underlying liver disease.  No evidence of cholelithiasis, 

cholecystitis or 


                    biliary dilatation is identified.


                  There is mild anasarca.


* Elevated Ca 19-9


* Hx of asthma





Plan


* awaiting biopsy result


* continue present medications





Subjective


24 Hr Interval Summary


Free Text/Dictation


* Course reviewed with RN


* patient seen and examined


* still awaiting biopsy result





Exam/Review of Systems


Vital Signs


Vitals





 Vital Signs








  Date Time  Temp Pulse Resp B/P Pulse Ox O2 Delivery O2 Flow Rate FiO2


 


5/22/17 07:45 98.0 62 19 83/47 98   


 


5/20/17 04:00      Room Air  














 Intake and Output   


 


 5/21/17 5/21/17 5/22/17





 15:00 23:00 07:00


 


Intake Total  950 ml 300 ml


 


Balance  950 ml 300 ml











Exam


Constitutional:  alert, oriented


Neck:  non-tender, supple


Respiratory:  clear to auscultation, normal air movement


Cardiovascular:  nl pulses, regular rate and rhythm


Gastrointestinal:  non-tender, soft


Musculoskeletal:  nl extremities to inspection


Extremities:  normal pulses





Results


Result Diagram:  


5/19/17 0418                                                                   

             5/21/17 0839








Medications


Medications





 Current Medications


Ondansetron HCl (Zofran Inj) 4 mg Q6H  PRN IV NAUSEA AND/OR VOMITING Last 

administered on 5/19/17at 15:48; Admin Dose 4 MG;  Start 5/12/17 at 22:30


Morphine Sulfate (morphine) 2 mg Q4H  PRN IV SEVERE PAIN LEVEL 7-10 Last 

administered on 5/19/17at 15:46; Admin Dose 2 MG;  Start 5/12/17 at 22:30


Famotidine 20 mg 20 mg Q12 PO  Last administered on 5/22/17at 08:53; Admin Dose 

20 MG;  Start 5/13/17 at 09:00


Sodium Chloride (NS) 1,000 ml @  70 mls/hr [id number] IV  Last administered on 5/20 /17at 20:31; Admin Dose 70 MLS/HR;  Start 5/19/17 at 00:00











BRET SWAIN MD May 22, 2017 16:09

## 2017-05-22 NOTE — PN
Date/Time of Note


Date/Time of Note


DATE: 5/22/17 


TIME: 14:56





Assessment/Plan


VTE Prophylaxis


VTE Prophylaxis Intervention:  SCD's





Lines/Catheters


IV Catheter Type (from [facility]):  Saline Lock


Urinary Cath still in place:  No





Assessment/Plan


Chief Complaint/Hosp Course


Assessment and plan





1.  Hyperbilirubinemia with transaminitis suspect secondary to autoimmune 

hepatitis.  Hepatitis panel is negative.  Awaiting biopsy results.  Will follow 

up.


2.  Positive tumor markers.  Patient noted with elevated  and CEA in the 

setting of inflammatory process.  Awaiting biopsy from ERCP.  Follow-up with 

oncologist recommendations





3.  Polyarthritis.  Etiology unclear.  Analgesics as needed.


4.  History of asthma.  No active bronchospasm.  Will provide with 

bronchodilators as needed





DVT prophylaxis: Early ambulation





GERD prophylaxis: H2 blocker





Disposition plan: Awaiting ERCP biopsy results.  Will follow up.  Discharge 

when medically stable and cleared by consultants





Discussed plan of care with 


Problems:  





Subjective


24 Hr Interval Summary


Free Text/Dictation


Comfortable at this time.  No apparent distress





Exam/Review of Systems


Vital Signs


Vitals





 Vital Signs








  Date Time  Temp Pulse Resp B/P Pulse Ox O2 Delivery O2 Flow Rate FiO2


 


5/22/17 07:45 98.0 62 19 83/47 98   


 


5/20/17 04:00      Room Air  














 Intake and Output   


 


 5/21/17 5/21/17 5/22/17





 15:00 23:00 07:00


 


Intake Total  950 ml 300 ml


 


Balance  950 ml 300 ml











Exam


Constitutional:  alert, oriented


Head:  normocephalic


Neck:  supple, 


   No jvd


Respiratory:  clear to auscultation, normal air movement


Cardiovascular:  regular rate and rhythm


Gastrointestinal:  non-tender, soft


Musculoskeletal:  nl extremities to inspection


Extremities:  normal pulses


Neurological:  CNS II-XII intact, nl mental status, nl speech





Results


Result Diagram:  


5/19/17 0418                                                                   

             5/21/17 0839








Medications


Medications





 Current Medications


Ondansetron HCl (Zofran Inj) 4 mg Q6H  PRN IV NAUSEA AND/OR VOMITING Last 

administered on 5/19/17at 15:48; Admin Dose 4 MG;  Start 5/12/17 at 22:30


Morphine Sulfate (morphine) 2 mg Q4H  PRN IV SEVERE PAIN LEVEL 7-10 Last 

administered on 5/19/17at 15:46; Admin Dose 2 MG;  Start 5/12/17 at 22:30


Famotidine 20 mg 20 mg Q12 PO  Last administered on 5/22/17at 08:53; Admin Dose 

20 MG;  Start 5/13/17 at 09:00


Sodium Chloride (NS) 1,000 ml @  70 mls/hr [id number] IV  Last administered on 5/20 /17at 20:31; Admin Dose 70 MLS/HR;  Start 5/19/17 at 00:00











POLLO HOOVER May 22, 2017 15:04

## 2017-05-22 NOTE — CONS
Date/Time of Note


Date/Time of Note


DATE: 5/22/17 


TIME: 10:36





Assessment/Plan


Assessment/Plan


Chief Complaint/Hosp Course


The patient is a 22 year old male with hyperbilirubinemia, trending down since 

admission, now bilirubin 2.1 on 5/21/17, of unclear etiology with negative 

hepatitis panel, undergoing work-up for autoimmune hepatitis, with 

significantly elevated CA 19-9 at 869 (0-37) and CEA mildly elevated at 7.2 (0-5

).  AFP normal at 2.77 (0-7.21).  Repeat CA 19-9 decreased to 571 on 5/18/17.





- MRCP 5/13/17 demonstrated hepatomegaly at 20 cm with no focal hepatic mass is 

identified, hepatic periportal edema is noted, which can be seen in the setting 

of hepatitis.  Gallbladder is contracted, and demonstrates circumferential wall 

edema, likely reactive secondary to underlying liver disease.  Splenomegaly is 

noted measuring 14 cm.  Biliary tree, pancreas, adrenal glands and kidneys are 

unremarkable.  There is no retroperitoneal or lacy hepatis lymphadenopathy.  

Mild upper abdominal ascites is noted.  


- Appreciate GI recs.  s/p ERCP 5/7/17 that showed no filling defects in CBD, ?

gastroparesis for which Reglan was added.


- Will consider outpatient EUS to rule out pancreaticobiliary lesion. CA 19-9 

is significantly elevated and may be due to underlying inflammatory/autoimmune 

process, however would need to rule out underlying pancreaticobiliary 

malignancy such as pancreatic or intrahepatic cholangiocarcinoma.  Patient can 

follow-up with us as an outpatient for EUS/GI referral.


- s/p liver biopsy 5/19/17, pending results.





# Normocytic anemia, stable, iron panel consistent with anemia of chronic 

inflammation, B12/folate/TSH WNL, retic count inappropriately low, LDH 

elevated. Methylmalonic acid and homocysteine within normal limits.  

Haptoglobin was < 15, which may be secondary to liver disease.  Peripheral 

smear reviewed by path, showed anisocytosis, normal WBCs, no schistocytes or 

spherocytes. Ashley' test negative. 





# Polyarthritis, REID negative, anti-mitochondrial Ab neg, smooth muscle Ab 

negative, ds DNA elevated at 384.  Rheumatology follow-up.


Problems:  





Consultation Date/Type/Reason


Admit Date/Time


May 12, 2017 at 21:48


Initial Consult Date


5/15/17


Type of Consultation:  Oncology





24 HR Interval Summary


Free Text/Dictation


Patient doing well, denies pain.  Awaiting liver biopsy results.





Exam/Review of Systems


Vital Signs


Vitals





 Vital Signs








  Date Time  Temp Pulse Resp B/P Pulse Ox O2 Delivery O2 Flow Rate FiO2


 


5/22/17 07:45 98.0 62 19 83/47 98   


 


5/20/17 04:00      Room Air  














 Intake and Output   


 


 5/21/17 5/21/17 5/22/17





 15:00 23:00 07:00


 


Intake Total  950 ml 300 ml


 


Balance  950 ml 300 ml











Exam


Constitutional:  alert, oriented


Psych:  no complaints


Eyes:  icteric


Neck:  supple


Respiratory:  clear to auscultation


Cardiovascular:  regular rate and rhythm


Gastrointestinal:  non-tender, soft


Musculoskeletal:  nl extremities to inspection


Neurological:  CNS II-XII intact





Results


Result Diagram:  


5/19/17 0418                                                                   

             5/21/17 0839





Results 24 hrs





Laboratory Tests








Test


  5/21/17


12:08


 


Lab Scanned Report REFERENCE LAB  











Medications


Medications





 Current Medications


Ondansetron HCl (Zofran Inj) 4 mg Q6H  PRN IV NAUSEA AND/OR VOMITING Last 

administered on 5/19/17at 15:48; Admin Dose 4 MG;  Start 5/12/17 at 22:30


Morphine Sulfate (morphine) 2 mg Q4H  PRN IV SEVERE PAIN LEVEL 7-10 Last 

administered on 5/19/17at 15:46; Admin Dose 2 MG;  Start 5/12/17 at 22:30


Famotidine 20 mg 20 mg Q12 PO  Last administered on 5/22/17at 08:53; Admin Dose 

20 MG;  Start 5/13/17 at 09:00


Sodium Chloride (NS) 1,000 ml @  70 mls/hr [id number] IV  Last administered on 5/20 /17at 20:31; Admin Dose 70 MLS/HR;  Start 5/19/17 at 00:00











TOALCIDES MD May 22, 2017 10:37

## 2017-05-23 VITALS — SYSTOLIC BLOOD PRESSURE: 86 MMHG | RESPIRATION RATE: 19 BRPM | DIASTOLIC BLOOD PRESSURE: 49 MMHG

## 2017-05-23 VITALS — SYSTOLIC BLOOD PRESSURE: 101 MMHG | RESPIRATION RATE: 20 BRPM | DIASTOLIC BLOOD PRESSURE: 56 MMHG

## 2017-05-23 LAB
ALP SERPL-CCNC: 288 IU/L (ref 42–121)
ALT SERPL-CCNC: 597 IU/L (ref 13–69)
AST SERPL-CCNC: 722 IU/L (ref 15–46)
BILIRUB DIRECT SERPL-MCNC: 0.7 MG/DL (ref 0–0.2)
BILIRUB SERPL-MCNC: 1.6 MG/DL (ref 0.2–1.3)

## 2017-05-23 RX ADMIN — FOLIC ACID SCH MLS/HR: 5 INJECTION, SOLUTION INTRAMUSCULAR; INTRAVENOUS; SUBCUTANEOUS at 21:27

## 2017-05-23 RX ADMIN — FAMOTIDINE SCH MG: 20 TABLET ORAL at 08:53

## 2017-05-23 RX ADMIN — FAMOTIDINE SCH MG: 20 TABLET ORAL at 21:16

## 2017-05-23 RX ADMIN — FOLIC ACID SCH MLS/HR: 5 INJECTION, SOLUTION INTRAMUSCULAR; INTRAVENOUS; SUBCUTANEOUS at 17:28

## 2017-05-23 NOTE — PN
Date/Time of Note


Date/Time of Note


DATE: 5/23/17 


TIME: 15:59





Assessment/Plan


VTE Prophylaxis


VTE Prophylaxis Intervention:  SCD's, other





Lines/Catheters


IV Catheter Type (from [facility]):  Saline Lock


Urinary Cath still in place:  No





Assessment/Plan


Assessment/Plan


Jaundice/Transaminitis


               Hepatocellular consider autoimmune hepatitis 


                  ERCP 5/17/2017 


                          Retained fluid in the stomach (suctioned out.)


                          ? gastroparesis.


                           Normal ampulla of Vater.  


                        .  Normal cholangiogram. 


                  MRI abdomen


                     Hepatomegaly is noted.  Hepatic periportal edema is 

identified, which can be seen in the setting of hepatitis.


                     Splenomegaly is noted measuring 14 cm.


                .  There is mild upper abdominal ascites.


               .  Circumferential gallbladder wall thickening is noted, likely 

reactive secondary to underlying liver disease.  No evidence of cholelithiasis, 

cholecystitis or 


                    biliary dilatation is identified.


                  There is mild anasarca.


* Elevated Ca 19-9


* Hx of asthma





Plan


* awaiting biopsy result


* continue present medications





Subjective


24 Hr Interval Summary


Free Text/Dictation


* Course reviewed with RN


* Patient seen and examined


* awaiting biopsy result





Exam/Review of Systems


Vital Signs


Vitals





 Vital Signs








  Date Time  Temp Pulse Resp B/P Pulse Ox O2 Delivery O2 Flow Rate FiO2


 


5/23/17 07:59 96.2 63 19 86/49 99   


 


5/20/17 04:00      Room Air  














 Intake and Output   


 


 5/22/17 5/22/17 5/23/17





 14:59 22:59 06:59


 


Intake Total 0 ml 500 ml 1080 ml


 


Balance 0 ml 500 ml 1080 ml











Exam


Constitutional:  alert


Head:  normocephalic


Neck:  non-tender, supple


Respiratory:  clear to auscultation, normal air movement


Cardiovascular:  nl pulses, regular rate and rhythm


Gastrointestinal:  soft


Musculoskeletal:  nl extremities to inspection


Extremities:  normal pulses





Results


Result Diagram:  


5/19/17 0418                                                                   

             5/21/17 0839








Medications


Medications





 Current Medications


Ondansetron HCl (Zofran Inj) 4 mg Q6H  PRN IV NAUSEA AND/OR VOMITING Last 

administered on 5/19/17at 15:48; Admin Dose 4 MG;  Start 5/12/17 at 22:30


Morphine Sulfate (morphine) 2 mg Q4H  PRN IV SEVERE PAIN LEVEL 7-10 Last 

administered on 5/19/17at 15:46; Admin Dose 2 MG;  Start 5/12/17 at 22:30


Famotidine 20 mg 20 mg Q12 PO  Last administered on 5/23/17at 08:53; Admin Dose 

20 MG;  Start 5/13/17 at 09:00


Sodium Chloride (NS) 1,000 ml @  70 mls/hr [id number] IV  Last administered on 5/22 /17at 20:48; Admin Dose 70 MLS/HR;  Start 5/19/17 at 00:00











BRET SWAIN MD May 23, 2017 16:00

## 2017-05-23 NOTE — PN
Date/Time of Note


Date/Time of Note


DATE: 5/23/17 


TIME: 13:52





Assessment/Plan


VTE Prophylaxis


VTE Prophylaxis Intervention:  SCD's





Lines/Catheters


IV Catheter Type (from [facility]):  Saline Lock


Urinary Cath still in place:  No





Assessment/Plan


Chief Complaint/Hosp Course


Assessment and plan





1.  Hyperbilirubinemia with transaminitis suspect secondary to autoimmune 

hepatitis.  Hepatitis panel is negative.  Awaiting biopsy results.





2.  Positive tumor markers.  Patient noted with elevated  and CEA in the 

setting of inflammatory process.  Awaiting biopsy from ERCP.  Follow-up with 

oncologist recommendations





3.  Polyarthritis.  Etiology unclear.  Analgesics as needed.  Tentative plan 

for outpatient consultation by rheumatology





4.  History of asthma.  No active bronchospasm.  Will provide with 

bronchodilators as needed





DVT prophylaxis: Early ambulation





GERD prophylaxis: H2 blocker





Disposition plan: Still awaiting biopsy results.  Discharge and cleared by 

consultants





Discussed plan of care with 


Problems:  





Subjective


24 Hr Interval Summary


Free Text/Dictation


Comfortable at this time.  Denies any pain





Exam/Review of Systems


Vital Signs


Vitals





 Vital Signs








  Date Time  Temp Pulse Resp B/P Pulse Ox O2 Delivery O2 Flow Rate FiO2


 


5/23/17 07:59 96.2 63 19 86/49 99   


 


5/20/17 04:00      Room Air  














 Intake and Output   


 


 5/22/17 5/22/17 5/23/17





 15:00 23:00 07:00


 


Intake Total  500 ml 1080 ml


 


Balance  500 ml 1080 ml











Exam


Constitutional:  alert, oriented


Psych:  nl mood/affect


Head:  normocephalic


Neck:  supple, 


   No jvd


Respiratory:  clear to auscultation, normal air movement


Cardiovascular:  regular rate and rhythm


Gastrointestinal:  non-tender, soft


Musculoskeletal:  nl extremities to inspection


Extremities:  normal pulses


Neurological:  CNS II-XII intact, nl mental status, nl speech


Skin:  nl turgor





Results


Result Diagram:  


5/19/17 0418                                                                   

             5/21/17 0839








Medications


Medications





 Current Medications


Ondansetron HCl (Zofran Inj) 4 mg Q6H  PRN IV NAUSEA AND/OR VOMITING Last 

administered on 5/19/17at 15:48; Admin Dose 4 MG;  Start 5/12/17 at 22:30


Morphine Sulfate (morphine) 2 mg Q4H  PRN IV SEVERE PAIN LEVEL 7-10 Last 

administered on 5/19/17at 15:46; Admin Dose 2 MG;  Start 5/12/17 at 22:30


Famotidine 20 mg 20 mg Q12 PO  Last administered on 5/23/17at 08:53; Admin Dose 

20 MG;  Start 5/13/17 at 09:00


Sodium Chloride (NS) 1,000 ml @  70 mls/hr [id number] IV  Last administered on 5/22 /17at 20:48; Admin Dose 70 MLS/HR;  Start 5/19/17 at 00:00











POLLO HOOVER May 23, 2017 13:53

## 2017-05-24 VITALS — SYSTOLIC BLOOD PRESSURE: 102 MMHG | DIASTOLIC BLOOD PRESSURE: 54 MMHG | RESPIRATION RATE: 18 BRPM

## 2017-05-24 VITALS — RESPIRATION RATE: 20 BRPM | DIASTOLIC BLOOD PRESSURE: 58 MMHG | SYSTOLIC BLOOD PRESSURE: 99 MMHG

## 2017-05-24 RX ADMIN — FAMOTIDINE SCH MG: 20 TABLET ORAL at 08:22

## 2017-05-24 RX ADMIN — FAMOTIDINE SCH MG: 20 TABLET ORAL at 20:14

## 2017-05-24 RX ADMIN — FOLIC ACID SCH MLS/HR: 5 INJECTION, SOLUTION INTRAMUSCULAR; INTRAVENOUS; SUBCUTANEOUS at 13:56

## 2017-05-24 NOTE — CONS
Date/Time of Note


Date/Time of Note


DATE: 5/24/17 


TIME: 11:42





Assessment/Plan


Assessment/Plan


Chief Complaint/Hosp Course


The patient is a 22 year old male with hyperbilirubinemia, trending down since 

admission, now bilirubin 2.1 on 5/21/17, of unclear etiology with negative 

hepatitis panel, undergoing work-up for autoimmune hepatitis, with 

significantly elevated CA 19-9 at 869 (0-37) and CEA mildly elevated at 7.2 (0-5

).  AFP normal at 2.77 (0-7.21).  Repeat CA 19-9 decreased to 571 on 5/18/17.





- MRCP 5/13/17 demonstrated hepatomegaly at 20 cm with no focal hepatic mass is 

identified, hepatic periportal edema is noted, which can be seen in the setting 

of hepatitis.  Gallbladder is contracted, and demonstrates circumferential wall 

edema, likely reactive secondary to underlying liver disease.  Splenomegaly is 

noted measuring 14 cm.  Biliary tree, pancreas, adrenal glands and kidneys are 

unremarkable.  There is no retroperitoneal or lacy hepatis lymphadenopathy.  

Mild upper abdominal ascites is noted.  


- Appreciate GI recs.  s/p ERCP 5/7/17 that showed no filling defects in CBD, ?

gastroparesis for which Reglan was added.


- Will consider outpatient EUS to rule out pancreaticobiliary lesion. CA 19-9 

is significantly elevated and may be due to underlying inflammatory/autoimmune 

process, however would need to rule out underlying pancreaticobiliary 

malignancy such as pancreatic or intrahepatic cholangiocarcinoma.  Patient can 

follow-up with us as an outpatient for EUS/GI referral.  Pending auth for 

patient to follow up with me as an outpatient as well as for EUS/GI referral.


- s/p liver biopsy 5/19/17, prelim results:


Liver, needle core biopsies:


-- Chronic hepatitis with portal and lobular inflammation and interface activity

, cholangiolitis 


and portal, periportal, pericellular fibrosis and focal bridging fibrosis (see 

comment).


-- No malignancy or liver cirrhosis is identified.


 


COMMENT:  Autoimmune hepatitis and primary biliary cirrhosis are a 

consideration.  This case will 


be sent in consultation with Dr. Kevin Bull of [facility] and a final 

report will follow.


 





# Normocytic anemia, stable, iron panel consistent with anemia of chronic 

inflammation, B12/folate/TSH WNL, retic count inappropriately low, LDH 

elevated. Methylmalonic acid and homocysteine within normal limits.  

Haptoglobin was < 15, which may be secondary to liver disease.  Peripheral 

smear reviewed by path, showed anisocytosis, normal WBCs, no schistocytes or 

spherocytes. Ashley' test negative. 





# Polyarthritis, REID negative, anti-mitochondrial Ab neg, smooth muscle Ab 

negative, ds DNA elevated at 384.  Rheumatology follow-up.


Problems:  





Consultation Date/Type/Reason


Admit Date/Time


May 12, 2017 at 21:48


Initial Consult Date


5/15/17


Type of Consultation:  Oncology





24 HR Interval Summary


Free Text/Dictation


Patient doing well, no complaints.  No abdominal pain.





Exam/Review of Systems


Vital Signs


Vitals





 Vital Signs








  Date Time  Temp Pulse Resp B/P Pulse Ox O2 Delivery O2 Flow Rate FiO2


 


5/24/17 08:08 98.2 64 18 102/54 99   














 Intake and Output   


 


 5/23/17 5/23/17 5/24/17





 15:00 23:00 07:00


 


Intake Total 350 ml 480 ml 1030 ml


 


Balance 350 ml 480 ml 1030 ml











Exam


Constitutional:  alert, oriented


Psych:  no complaints


Eyes:  icteric


Neck:  supple


Respiratory:  clear to auscultation


Cardiovascular:  regular rate and rhythm


Gastrointestinal:  non-tender, soft


Musculoskeletal:  nl extremities to inspection


Neurological:  CNS II-XII intact





Results


Result Diagram:  


5/21/17 0839





Results 24 hrs





Laboratory Tests








Test


  5/23/17


16:45


 


Total Bilirubin 1.6  H


 


Direct Bilirubin 0.70  H


 


Indirect Bilirubin 0.9  


 


Aspartate Amino Transf


(AST/SGOT) 722  H


 


 


Alanine Aminotransferase


(ALT/SGPT) 597  H


 


 


Alkaline Phosphatase 288  H











Medications


Medications





 Current Medications


Ondansetron HCl (Zofran Inj) 4 mg Q6H  PRN IV NAUSEA AND/OR VOMITING Last 

administered on 5/19/17at 15:48; Admin Dose 4 MG;  Start 5/12/17 at 22:30


Morphine Sulfate (morphine) 2 mg Q4H  PRN IV SEVERE PAIN LEVEL 7-10 Last 

administered on 5/19/17at 15:46; Admin Dose 2 MG;  Start 5/12/17 at 22:30


Famotidine 20 mg 20 mg Q12 PO  Last administered on 5/24/17at 08:22; Admin Dose 

20 MG;  Start 5/13/17 at 09:00


Sodium Chloride (NS) 1,000 ml @  70 mls/hr [id number] IV  Last administered on 5/23

/17at 21:27; Admin Dose 70 MLS/HR;  Start 5/19/17 at 00:00











TOALCIDES MD May 24, 2017 11:43

## 2017-05-24 NOTE — PN
Date/Time of Note


Date/Time of Note


DATE: 5/24/17 


TIME: 14:35





Assessment/Plan


VTE Prophylaxis


VTE Prophylaxis Intervention:  ambulation





Lines/Catheters


IV Catheter Type (from [facility]):  Peripheral IV


Urinary Cath still in place:  No





Assessment/Plan


Chief Complaint/Hosp Course


Assessment and plan





1.  Hyperbilirubinemia with transaminitis suspect secondary to autoimmune 

hepatitis.  Hepatitis panel is negative.  Preliminary biopsy negative for any 

malignancy or liver cirrhosis.  Autoimmune hepatitis versus primary biliary 

cirrhosis considered.  Discussed with Dr. Rodriguez, will start patient on 

steroid empirically.  Monitor for clinical improvement





2.  Positive tumor markers.  Patient noted with elevated  and CEA in the 

setting of inflammatory process.  Follow-up with oncologist recommendations





3.  Polyarthritis.  Etiology unclear.  Analgesics as needed.  Tentative plan 

for outpatient consultation by rheumatology





4.  History of asthma.  No active bronchospasm.  Will provide with 

bronchodilators as needed





DVT prophylaxis: Early ambulation





GERD prophylaxis: H2 blocker





Disposition plan: Patient started on prednisone.  Will assess for clinical 

improvement of hepatitis





Discussed plan of care with 


Problems:  





Subjective


24 Hr Interval Summary


Free Text/Dictation


denies any pain. no s/s of distress





Exam/Review of Systems


Vital Signs


Vitals





 Vital Signs








  Date Time  Temp Pulse Resp B/P Pulse Ox O2 Delivery O2 Flow Rate FiO2


 


5/24/17 08:08 98.2 64 18 102/54 99   














 Intake and Output   


 


 5/23/17 5/23/17 5/24/17





 15:00 23:00 07:00


 


Intake Total 350 ml 480 ml 1030 ml


 


Balance 350 ml 480 ml 1030 ml











Exam


Constitutional:  alert, oriented


Head:  normocephalic


Respiratory:  clear to auscultation, normal air movement


Cardiovascular:  regular rate and rhythm


Gastrointestinal:  non-tender, soft


Musculoskeletal:  nl extremities to inspection


Extremities:  normal pulses


Neurological:  CNS II-XII intact, nl mental status, nl speech


Skin:  nl turgor





Results


Result Diagram:  


5/21/17 0839





Results 24 hrs





Laboratory Tests








Test


  5/23/17


16:45


 


Total Bilirubin 1.6  H


 


Direct Bilirubin 0.70  H


 


Indirect Bilirubin 0.9  


 


Aspartate Amino Transf


(AST/SGOT) 722  H


 


 


Alanine Aminotransferase


(ALT/SGPT) 597  H


 


 


Alkaline Phosphatase 288  H











Medications


Medications





 Current Medications


Ondansetron HCl (Zofran Inj) 4 mg Q6H  PRN IV NAUSEA AND/OR VOMITING Last 

administered on 5/19/17at 15:48; Admin Dose 4 MG;  Start 5/12/17 at 22:30


Morphine Sulfate (morphine) 2 mg Q4H  PRN IV SEVERE PAIN LEVEL 7-10 Last 

administered on 5/19/17at 15:46; Admin Dose 2 MG;  Start 5/12/17 at 22:30


Famotidine 20 mg 20 mg Q12 PO  Last administered on 5/24/17at 08:22; Admin Dose 

20 MG;  Start 5/13/17 at 09:00


Sodium Chloride (NS) 1,000 ml @  70 mls/hr [id number] IV  Last administered on 5/24 /17at 13:56; Admin Dose 70 MLS/HR;  Start 5/19/17 at 00:00


Prednisone (Prednisone) 60 mg DAILY PO  Last administered on 5/24/17at 14:18; 

Admin Dose 60 MG;  Start 5/24/17 at 14:00











POLLO HOOVER May 24, 2017 14:39

## 2017-05-24 NOTE — PN
Date/Time of Note


Date/Time of Note


DATE: 5/24/17 


TIME: 16:26





Assessment/Plan


VTE Prophylaxis


VTE Prophylaxis Intervention:  ambulation





Lines/Catheters


IV Catheter Type (from [facility]):  Peripheral IV


Urinary Cath still in place:  No





Assessment/Plan


Assessment/Plan


Jaundice/Transaminitis


               Hepatocellular consider autoimmune hepatitis 


                  ERCP 5/17/2017 


                          Retained fluid in the stomach (suctioned out.)


                          ? gastroparesis.


                           Normal ampulla of Vater.  


                        .  Normal cholangiogram. 


                  MRI abdomen


                     Hepatomegaly is noted.  Hepatic periportal edema is 

identified, which can be seen in the setting of hepatitis.


                     Splenomegaly is noted measuring 14 cm.


                .  There is mild upper abdominal ascites.


               .  Circumferential gallbladder wall thickening is noted, likely 

reactive secondary to underlying liver disease.  No evidence of cholelithiasis, 

cholecystitis or 


                    biliary dilatation is identified.


                  There is mild anasarca.


* Elevated Ca 19-9


* Hx of asthma





Plan


* awaiting final biopsy reading


* continue present medications





Subjective


24 Hr Interval Summary


Free Text/Dictation


* Course reviewed with RN


* Patient seen and examined


* Preliminary report


                   Liver, needle core biopsies:


-- Chronic hepatitis with portal and lobular inflammation and interface activity

, cholangiolitis 


and portal, periportal, pericellular fibrosis and focal bridging fibrosis (see 

comment).


-- No malignancy or liver cirrhosis is identified.


 


COMMENT:  Autoimmune hepatitis and primary biliary cirrhosis are a 

consideration.  This case will 


be sent in consultation with Dr. Kevin Bull of [facility] and a final 

report will follow.





Exam/Review of Systems


Vital Signs


Vitals





 Vital Signs








  Date Time  Temp Pulse Resp B/P Pulse Ox O2 Delivery O2 Flow Rate FiO2


 


5/24/17 08:08 98.2 64 18 102/54 99   














 Intake and Output   


 


 5/23/17 5/23/17 5/24/17





 14:59 22:59 06:59


 


Intake Total 350 ml 480 ml 1030 ml


 


Balance 350 ml 480 ml 1030 ml











Exam


Constitutional:  alert, oriented


Eyes:  nl conjunctiva


Neck:  non-tender, supple


Respiratory:  clear to auscultation, normal air movement


Cardiovascular:  nl pulses, regular rate and rhythm


Gastrointestinal:  soft


Musculoskeletal:  nl extremities to inspection, nl gait and stance


Extremities:  normal pulses





Results


Result Diagram:  


5/21/17 0839





Results 24 hrs





Laboratory Tests








Test


  5/23/17


16:45


 


Total Bilirubin 1.6  H


 


Direct Bilirubin 0.70  H


 


Indirect Bilirubin 0.9  


 


Aspartate Amino Transf


(AST/SGOT) 722  H


 


 


Alanine Aminotransferase


(ALT/SGPT) 597  H


 


 


Alkaline Phosphatase 288  H











Medications


Medications





 Current Medications


Ondansetron HCl (Zofran Inj) 4 mg Q6H  PRN IV NAUSEA AND/OR VOMITING Last 

administered on 5/19/17at 15:48; Admin Dose 4 MG;  Start 5/12/17 at 22:30


Morphine Sulfate (morphine) 2 mg Q4H  PRN IV SEVERE PAIN LEVEL 7-10 Last 

administered on 5/19/17at 15:46; Admin Dose 2 MG;  Start 5/12/17 at 22:30


Famotidine 20 mg 20 mg Q12 PO  Last administered on 5/24/17at 08:22; Admin Dose 

20 MG;  Start 5/13/17 at 09:00


Sodium Chloride (NS) 1,000 ml @  70 mls/hr [id number] IV  Last administered on 5/24 /17at 13:56; Admin Dose 70 MLS/HR;  Start 5/19/17 at 00:00


Prednisone (Prednisone) 60 mg DAILY PO  Last administered on 5/24/17at 14:18; 

Admin Dose 60 MG;  Start 5/24/17 at 14:00











BRET SWAIN MD May 24, 2017 16:28

## 2017-05-25 VITALS — RESPIRATION RATE: 18 BRPM | SYSTOLIC BLOOD PRESSURE: 100 MMHG | DIASTOLIC BLOOD PRESSURE: 59 MMHG

## 2017-05-25 VITALS — RESPIRATION RATE: 20 BRPM | SYSTOLIC BLOOD PRESSURE: 94 MMHG | DIASTOLIC BLOOD PRESSURE: 58 MMHG

## 2017-05-25 LAB
ALBUMIN SERPL-MCNC: 2.3 G/DL (ref 3.3–4.9)
ALBUMIN/GLOB SERPL: 0.58 {RATIO}
ALP SERPL-CCNC: 243 IU/L (ref 42–121)
ALT SERPL-CCNC: 515 IU/L (ref 13–69)
ANION GAP SERPL CALC-SCNC: 6 MMOL/L (ref 8–16)
AST SERPL-CCNC: 578 IU/L (ref 15–46)
BILIRUB DIRECT SERPL-MCNC: 0.6 MG/DL (ref 0–0.2)
BILIRUB SERPL-MCNC: 1.2 MG/DL (ref 0.2–1.3)
BUN SERPL-MCNC: 7 MG/DL (ref 7–20)
CALCIUM SERPL-MCNC: 8.2 MG/DL (ref 8.4–10.2)
CHLORIDE SERPL-SCNC: 106 MMOL/L (ref 97–110)
CO2 SERPL-SCNC: 26 MMOL/L (ref 21–31)
CREAT SERPL-MCNC: 0.51 MG/DL (ref 0.61–1.24)
GLOBULIN SER-MCNC: 3.9 G/DL (ref 1.3–3.2)
GLUCOSE SERPL-MCNC: 106 MG/DL (ref 70–220)
POTASSIUM SERPL-SCNC: 4.4 MMOL/L (ref 3.5–5.1)
PROT SERPL-MCNC: 6.2 G/DL (ref 6.1–8.1)
SODIUM SERPL-SCNC: 134 MMOL/L (ref 135–144)

## 2017-05-25 RX ADMIN — FAMOTIDINE SCH MG: 20 TABLET ORAL at 20:21

## 2017-05-25 RX ADMIN — FAMOTIDINE SCH MG: 20 TABLET ORAL at 09:01

## 2017-05-25 RX ADMIN — FOLIC ACID SCH MLS/HR: 5 INJECTION, SOLUTION INTRAMUSCULAR; INTRAVENOUS; SUBCUTANEOUS at 04:20

## 2017-05-25 RX ADMIN — FOLIC ACID SCH MLS/HR: 5 INJECTION, SOLUTION INTRAMUSCULAR; INTRAVENOUS; SUBCUTANEOUS at 13:18

## 2017-05-25 NOTE — CONS
Date/Time of Note


Date/Time of Note


DATE: 5/25/17 


TIME: 09:42





Assessment/Plan


Assessment/Plan


Chief Complaint/Hosp Course


The patient is a 22 year old male with hyperbilirubinemia, trending down since 

admission, now bilirubin 1.2 on 5/25/17, of unclear etiology with negative 

hepatitis panel, undergoing work-up for autoimmune hepatitis, with 

significantly elevated CA 19-9 at 869 (0-37) and CEA mildly elevated at 7.2 (0-5

).  AFP normal at 2.77 (0-7.21).  Repeat CA 19-9 decreased to 571 on 5/18/17.





- MRCP 5/13/17 demonstrated hepatomegaly at 20 cm with no focal hepatic mass is 

identified, hepatic periportal edema is noted, which can be seen in the setting 

of hepatitis.  Gallbladder is contracted, and demonstrates circumferential wall 

edema, likely reactive secondary to underlying liver disease.  Splenomegaly is 

noted measuring 14 cm.  Biliary tree, pancreas, adrenal glands and kidneys are 

unremarkable.  There is no retroperitoneal or lacy hepatis lymphadenopathy.  

Mild upper abdominal ascites is noted.  


- Appreciate GI recs.  s/p ERCP 5/7/17 that showed no filling defects in CBD, ?

gastroparesis for which Reglan was added.


- Will consider outpatient EUS to rule out pancreaticobiliary lesion. CA 19-9 

is significantly elevated and may be due to underlying inflammatory/autoimmune 

process, however would need to rule out underlying pancreaticobiliary 

malignancy such as pancreatic or intrahepatic cholangiocarcinoma.  Patient can 

follow-up with us as an outpatient for EUS/GI referral.  Pending auth for 

patient to follow up with me as an outpatient as well as for EUS/GI referral - 

may need to be referred by primary care physician per case management.


- s/p liver biopsy 5/19/17, prelim results:


Liver, needle core biopsies:


-- Chronic hepatitis with portal and lobular inflammation and interface activity

, cholangiolitis 


and portal, periportal, pericellular fibrosis and focal bridging fibrosis (see 

comment).


-- No malignancy or liver cirrhosis is identified.


 


COMMENT:  Autoimmune hepatitis and primary biliary cirrhosis are a 

consideration.  This case will 


be sent in consultation with Dr. Kevin Bull of [place] and a final 

report will follow.


 





# Normocytic anemia, stable, iron panel consistent with anemia of chronic 

inflammation, B12/folate/TSH WNL, retic count inappropriately low, LDH 

elevated. Methylmalonic acid and homocysteine within normal limits.  

Haptoglobin was < 15, which may be secondary to liver disease.  Peripheral 

smear reviewed by path, showed anisocytosis, normal WBCs, no schistocytes or 

spherocytes. Ashley' test negative. 





# Polyarthritis, REID negative, anti-mitochondrial Ab neg, smooth muscle Ab 

negative, ds DNA elevated at 384.  Rheumatology follow-up.


Problems:  





Consultation Date/Type/Reason


Admit Date/Time


May 12, 2017 at 21:48


Initial Consult Date


5/15/17


Type of Consultation:  Oncology





24 HR Interval Summary


Free Text/Dictation


Patient doing well, no pain.





Exam/Review of Systems


Vital Signs


Vitals





 Vital Signs








  Date Time  Temp Pulse Resp B/P Pulse Ox O2 Delivery O2 Flow Rate FiO2


 


5/25/17 09:16 97.3 71 20 94/58 100   














 Intake and Output   


 


 5/24/17 5/24/17 5/25/17





 14:59 22:59 06:59


 


Intake Total 470 ml 1850 ml 1640 ml


 


Output Total  800 ml 


 


Balance 470 ml 1050 ml 1640 ml











Exam


Constitutional:  alert, oriented


Psych:  nl mood/affect, no complaints


Head:  normocephalic


Eyes:  nl conjunctiva


ENMT:  nl external ears & nose, nl lips & teeth


Neck:  non-tender, supple


Respiratory:  clear to auscultation, normal air movement


Cardiovascular:  regular rate and rhythm


Gastrointestinal:  soft


Musculoskeletal:  nl extremities to inspection, nl gait and stance


Extremities:  normal pulses, mild diffuse rash





Results


Result Diagram:  


5/25/17 0415





Results 24 hrs





Laboratory Tests








Test


  5/25/17


04:15


 


Sodium Level 134  L


 


Potassium Level 4.4  


 


Chloride Level 106  


 


Carbon Dioxide Level 26  


 


Anion Gap 6  L


 


Blood Urea Nitrogen 7  


 


Creatinine 0.51  L


 


Glucose Level 106  


 


Calcium Level 8.2  L


 


Total Bilirubin 1.2  


 


Direct Bilirubin 0.60  H


 


Indirect Bilirubin 0.6  


 


Aspartate Amino Transf


(AST/SGOT) 578  H


 


 


Alanine Aminotransferase


(ALT/SGPT) 515  H


 


 


Alkaline Phosphatase 243  H


 


Total Protein 6.2  


 


Albumin 2.3  L


 


Globulin 3.90  H


 


Albumin/Globulin Ratio 0.58  











Medications


Medications





 Current Medications


Ondansetron HCl (Zofran Inj) 4 mg Q6H  PRN IV NAUSEA AND/OR VOMITING Last 

administered on 5/19/17at 15:48; Admin Dose 4 MG;  Start 5/12/17 at 22:30


Morphine Sulfate (morphine) 2 mg Q4H  PRN IV SEVERE PAIN LEVEL 7-10 Last 

administered on 5/19/17at 15:46; Admin Dose 2 MG;  Start 5/12/17 at 22:30


Famotidine 20 mg 20 mg Q12 PO  Last administered on 5/25/17at 09:01; Admin Dose 

20 MG;  Start 5/13/17 at 09:00


Sodium Chloride (NS) 1,000 ml @  70 mls/hr [id number] IV  Last administered on 5/25 /17at 04:20; Admin Dose 70 MLS/HR;  Start 5/19/17 at 00:00


Prednisone (Prednisone) 60 mg DAILY PO  Last administered on 5/25/17at 09:01; 

Admin Dose 60 MG;  Start 5/24/17 at 14:00











TO,ALCIDES MARSH MD May 25, 2017 09:44

## 2017-05-25 NOTE — PN
Date/Time of Note


Date/Time of Note


DATE: 5/25/17 


TIME: 14:49





Assessment/Plan


VTE Prophylaxis


VTE Prophylaxis Intervention:  SCD's





Lines/Catheters


IV Catheter Type (from [facility]):  Peripheral IV


Urinary Cath still in place:  No





Assessment/Plan


Chief Complaint/Hosp Course


Assessment and plan





1.  Hyperbilirubinemia with transaminitis suspect secondary to autoimmune 

hepatitis.  Hepatitis panel is negative.  Preliminary biopsy negative for any 

malignancy or liver cirrhosis.  Autoimmune hepatitis versus primary biliary 

cirrhosis considered.  Continue on steroid treatment.  Await final pathology 

results





2.  Positive tumor markers.  Patient noted with elevated  and CEA in the 

setting of inflammatory process.  Follow-up with oncologist recommendations





3.  Polyarthritis.  Etiology unclear.  Analgesics as needed.  Tentative plan 

for outpatient consultation by rheumatology





4.  History of asthma.  No active bronchospasm.  Will provide with 

bronchodilators as needed





DVT prophylaxis: Early ambulation





GERD prophylaxis: H2 blocker





Disposition plan: continue on prednisone. Await final pathology result. still 

with elevated LFT. monitor for further downward trend prior to d/c 





Discussed plan of care with 


Problems:  





Subjective


24 Hr Interval Summary


Free Text/Dictation


comfortable at present. no s/s of distress





Exam/Review of Systems


Vital Signs


Vitals





 Vital Signs








  Date Time  Temp Pulse Resp B/P Pulse Ox O2 Delivery O2 Flow Rate FiO2


 


5/25/17 09:16 97.3 71 20 94/58 100   














 Intake and Output   


 


 5/24/17 5/24/17 5/25/17





 15:00 23:00 07:00


 


Intake Total 470 ml 1850 ml 1640 ml


 


Output Total  800 ml 


 


Balance 470 ml 1050 ml 1640 ml











Exam


Constitutional:  alert, oriented


Head:  normocephalic


Eyes:  nl conjunctiva


Neck:  supple, 


   No jvd


Respiratory:  normal air movement


Cardiovascular:  other (regular rate )


Gastrointestinal:  non-tender, soft


Musculoskeletal:  nl extremities to inspection, nl gait and stance


Neurological:  CNS II-XII intact, nl mental status, nl speech


Skin:  nl turgor





Results


Result Diagram:  


5/25/17 0415





Results 24 hrs





Laboratory Tests








Test


  5/25/17


04:15


 


Sodium Level 134  L


 


Potassium Level 4.4  


 


Chloride Level 106  


 


Carbon Dioxide Level 26  


 


Anion Gap 6  L


 


Blood Urea Nitrogen 7  


 


Creatinine 0.51  L


 


Glucose Level 106  


 


Calcium Level 8.2  L


 


Total Bilirubin 1.2  


 


Direct Bilirubin 0.60  H


 


Indirect Bilirubin 0.6  


 


Aspartate Amino Transf


(AST/SGOT) 578  H


 


 


Alanine Aminotransferase


(ALT/SGPT) 515  H


 


 


Alkaline Phosphatase 243  H


 


Total Protein 6.2  


 


Albumin 2.3  L


 


Globulin 3.90  H


 


Albumin/Globulin Ratio 0.58  











Medications


Medications





 Current Medications


Ondansetron HCl (Zofran Inj) 4 mg Q6H  PRN IV NAUSEA AND/OR VOMITING Last 

administered on 5/19/17at 15:48; Admin Dose 4 MG;  Start 5/12/17 at 22:30


Morphine Sulfate (morphine) 2 mg Q4H  PRN IV SEVERE PAIN LEVEL 7-10 Last 

administered on 5/19/17at 15:46; Admin Dose 2 MG;  Start 5/12/17 at 22:30


Famotidine 20 mg 20 mg Q12 PO  Last administered on 5/25/17at 09:01; Admin Dose 

20 MG;  Start 5/13/17 at 09:00


Sodium Chloride (NS) 1,000 ml @  70 mls/hr [id number] IV  Last administered on 5/25 /17at 04:20; Admin Dose 70 MLS/HR;  Start 5/19/17 at 00:00


Prednisone (Prednisone) 60 mg DAILY PO  Last administered on 5/25/17at 09:01; 

Admin Dose 60 MG;  Start 5/24/17 at 14:00











POLLO HOOVER May 25, 2017 14:53

## 2017-05-25 NOTE — PN
Date/Time of Note


Date/Time of Note


DATE: 5/25/17 


TIME: 16:31





Assessment/Plan


VTE Prophylaxis


VTE Prophylaxis Intervention:  ambulation





Lines/Catheters


IV Catheter Type (from [facility]):  Peripheral IV


Urinary Cath still in place:  No





Assessment/Plan


Assessment/Plan


Jaundice/Transaminitis


               Hepatocellular consider autoimmune hepatitis vs primary biliary 

cirrhosis 


                  ERCP 5/17/2017 


                          Retained fluid in the stomach (suctioned out.)


                          ? gastroparesis.


                           Normal ampulla of Vater.  


                        .  Normal cholangiogram. 


                  MRI abdomen


                     Hepatomegaly is noted.  Hepatic periportal edema is 

identified, which can be seen in the setting of hepatitis.


                     Splenomegaly is noted measuring 14 cm.


                .  There is mild upper abdominal ascites.


               .  Circumferential gallbladder wall thickening is noted, likely 

reactive secondary to underlying liver disease.  No evidence of cholelithiasis, 

cholecystitis or 


                    biliary dilatation is identified.


                  There is mild anasarca.


* Elevated Ca 19-9


* Hx of asthma





Subjective


24 Hr Interval Summary


Free Text/Dictation


* Course reviewed


* Patient seen and examine





Exam/Review of Systems


Vital Signs


Vitals





 Vital Signs








  Date Time  Temp Pulse Resp B/P Pulse Ox O2 Delivery O2 Flow Rate FiO2


 


5/25/17 09:16 97.3 71 20 94/58 100   














 Intake and Output   


 


 5/24/17 5/24/17 5/25/17





 15:00 23:00 07:00


 


Intake Total 470 ml 1850 ml 1640 ml


 


Output Total  800 ml 


 


Balance 470 ml 1050 ml 1640 ml











Exam


Constitutional:  alert


Eyes:  icteric


Neck:  non-tender, supple


Respiratory:  clear to auscultation, normal air movement


Cardiovascular:  nl pulses, regular rate and rhythm


Gastrointestinal:  bowel sounds, soft


Musculoskeletal:  nl extremities to inspection


Extremities:  normal pulses





Results


Result Diagram:  


5/25/17 0415





Results 24 hrs





Laboratory Tests








Test


  5/25/17


04:15


 


Sodium Level 134  L


 


Potassium Level 4.4  


 


Chloride Level 106  


 


Carbon Dioxide Level 26  


 


Anion Gap 6  L


 


Blood Urea Nitrogen 7  


 


Creatinine 0.51  L


 


Glucose Level 106  


 


Calcium Level 8.2  L


 


Total Bilirubin 1.2  


 


Direct Bilirubin 0.60  H


 


Indirect Bilirubin 0.6  


 


Aspartate Amino Transf


(AST/SGOT) 578  H


 


 


Alanine Aminotransferase


(ALT/SGPT) 515  H


 


 


Alkaline Phosphatase 243  H


 


Total Protein 6.2  


 


Albumin 2.3  L


 


Globulin 3.90  H


 


Albumin/Globulin Ratio 0.58  











Medications


Medications





 Current Medications


Ondansetron HCl (Zofran Inj) 4 mg Q6H  PRN IV NAUSEA AND/OR VOMITING Last 

administered on 5/19/17at 15:48; Admin Dose 4 MG;  Start 5/12/17 at 22:30


Morphine Sulfate (morphine) 2 mg Q4H  PRN IV SEVERE PAIN LEVEL 7-10 Last 

administered on 5/19/17at 15:46; Admin Dose 2 MG;  Start 5/12/17 at 22:30


Famotidine 20 mg 20 mg Q12 PO  Last administered on 5/25/17at 09:01; Admin Dose 

20 MG;  Start 5/13/17 at 09:00


Sodium Chloride (NS) 1,000 ml @  70 mls/hr [id number] IV  Last administered on 5/25 /17at 04:20; Admin Dose 70 MLS/HR;  Start 5/19/17 at 00:00


Prednisone (Prednisone) 60 mg DAILY PO  Last administered on 5/25/17at 09:01; 

Admin Dose 60 MG;  Start 5/24/17 at 14:00











BRET SWAIN MD May 25, 2017 16:34

## 2017-05-26 VITALS — DIASTOLIC BLOOD PRESSURE: 55 MMHG | SYSTOLIC BLOOD PRESSURE: 99 MMHG | RESPIRATION RATE: 20 BRPM

## 2017-05-26 VITALS — RESPIRATION RATE: 20 BRPM | SYSTOLIC BLOOD PRESSURE: 98 MMHG | DIASTOLIC BLOOD PRESSURE: 56 MMHG

## 2017-05-26 LAB
ALBUMIN SERPL-MCNC: 2.3 G/DL (ref 3.3–4.9)
ALBUMIN/GLOB SERPL: 0.62 {RATIO}
ALP SERPL-CCNC: 210 IU/L (ref 42–121)
ALT SERPL-CCNC: 405 IU/L (ref 13–69)
ANION GAP SERPL CALC-SCNC: 4 MMOL/L (ref 8–16)
AST SERPL-CCNC: 294 IU/L (ref 15–46)
BILIRUB DIRECT SERPL-MCNC: 0 MG/DL (ref 0–0.2)
BILIRUB SERPL-MCNC: 0.5 MG/DL (ref 0.2–1.3)
BUN SERPL-MCNC: 7 MG/DL (ref 7–20)
CALCIUM SERPL-MCNC: 8.5 MG/DL (ref 8.4–10.2)
CHLORIDE SERPL-SCNC: 107 MMOL/L (ref 97–110)
CO2 SERPL-SCNC: 26 MMOL/L (ref 21–31)
CREAT SERPL-MCNC: 0.49 MG/DL (ref 0.61–1.24)
GLOBULIN SER-MCNC: 3.7 G/DL (ref 1.3–3.2)
GLUCOSE SERPL-MCNC: 97 MG/DL (ref 70–220)
POTASSIUM SERPL-SCNC: 4.2 MMOL/L (ref 3.5–5.1)
PROT SERPL-MCNC: 6 G/DL (ref 6.1–8.1)
SODIUM SERPL-SCNC: 133 MMOL/L (ref 135–144)

## 2017-05-26 RX ADMIN — FAMOTIDINE SCH MG: 20 TABLET ORAL at 21:14

## 2017-05-26 RX ADMIN — FOLIC ACID SCH MLS/HR: 5 INJECTION, SOLUTION INTRAMUSCULAR; INTRAVENOUS; SUBCUTANEOUS at 02:08

## 2017-05-26 RX ADMIN — FAMOTIDINE SCH MG: 20 TABLET ORAL at 08:38

## 2017-05-26 NOTE — PN
Date/Time of Note


Date/Time of Note


DATE: 5/26/17 


TIME: 13:55





Assessment/Plan


VTE Prophylaxis


VTE Prophylaxis Intervention:  ambulation





Lines/Catheters


IV Catheter Type (from [facility]):  Peripheral IV


Urinary Cath still in place:  No





Assessment/Plan


Chief Complaint/Hosp Course


Assessment and plan





1.  Hyperbilirubinemia with transaminitis suspect secondary to autoimmune 

hepatitis.  Hepatitis panel is negative.  Preliminary biopsy negative for any 

malignancy or liver cirrhosis.  Autoimmune hepatitis versus primary biliary 

cirrhosis considered.  improving on steroid treatment. continue 





2.  Positive tumor markers.  Patient noted with elevated  and CEA in the 

setting of inflammatory process.  Follow-up with oncologist recommendations





3.  Polyarthritis.  Etiology unclear.  Analgesics as needed.  Tentative plan 

for outpatient consultation by rheumatology





4.  History of asthma.  No active bronchospasm.  Will provide with 

bronchodilators as needed





DVT prophylaxis: Early ambulation





GERD prophylaxis: H2 blocker





Disposition plan: Improving on steroid treatment. will continue. working with 

case management to obtain authorization for patient follow-up with GI and 

hepatologist consult.  Discharge once set up





Discussed plan of care with 


Problems:  





Subjective


24 Hr Interval Summary


Free Text/Dictation


no s/s of distress. comfortable at this time





Exam/Review of Systems


Vital Signs


Vitals





 Vital Signs








  Date Time  Temp Pulse Resp B/P Pulse Ox O2 Delivery O2 Flow Rate FiO2


 


5/26/17 07:00 97.9 59 20 98/56 98   














 Intake and Output   


 


 5/25/17 5/25/17 5/26/17





 15:00 23:00 07:00


 


Intake Total  1500 ml 2320 ml


 


Output Total   1050 ml


 


Balance  1500 ml 1270 ml











Exam


Constitutional:  alert, oriented


Head:  normocephalic


Neck:  No jvd


Respiratory:  normal air movement


Cardiovascular:  other (regular rate )


Gastrointestinal:  non-tender, soft


Musculoskeletal:  nl extremities to inspection


Extremities:  normal pulses


Neurological:  CNS II-XII intact, nl mental status, nl speech





Results


Result Diagram:  


5/26/17 0420





Results 24 hrs





Laboratory Tests








Test


  5/26/17


04:20


 


Sodium Level 133  L


 


Potassium Level 4.2  


 


Chloride Level 107  


 


Carbon Dioxide Level 26  


 


Anion Gap 4  L


 


Blood Urea Nitrogen 7  


 


Creatinine 0.49  L


 


Glucose Level 97  


 


Calcium Level 8.5  


 


Total Bilirubin 0.5  


 


Direct Bilirubin 0.00  #


 


Indirect Bilirubin 0.5  


 


Aspartate Amino Transf


(AST/SGOT) 294  H


 


 


Alanine Aminotransferase


(ALT/SGPT) 405  H


 


 


Alkaline Phosphatase 210  H


 


Total Protein 6.0  L


 


Albumin 2.3  L


 


Globulin 3.70  H


 


Albumin/Globulin Ratio 0.62  











Medications


Medications





 Current Medications


Ondansetron HCl (Zofran Inj) 4 mg Q6H  PRN IV NAUSEA AND/OR VOMITING Last 

administered on 5/19/17at 15:48; Admin Dose 4 MG;  Start 5/12/17 at 22:30


Morphine Sulfate (morphine) 2 mg Q4H  PRN IV SEVERE PAIN LEVEL 7-10 Last 

administered on 5/19/17at 15:46; Admin Dose 2 MG;  Start 5/12/17 at 22:30


Famotidine (Pepcid) 20 mg Q12 PO  Last administered on 5/26/17at 08:38; Admin 

Dose 20 MG;  Start 5/13/17 at 09:00


Prednisone (Prednisone) 60 mg DAILY PO  Last administered on 5/26/17at 08:38; 

Admin Dose 60 MG;  Start 5/24/17 at 14:00











POLLO HOOVER May 26, 2017 14:02

## 2017-05-26 NOTE — CONS
Date/Time of Note


Date/Time of Note


DATE: 5/26/17 


TIME: 10:23





Assessment/Plan


Assessment/Plan


Chief Complaint/Hosp Course


The patient is a 22 year old male with hyperbilirubinemia, trending down since 

admission, now bilirubin 1.2 on 5/25/17 and today 0.5 on 5/26/17 with 

transaminases trending down to 200-400 range, of unclear etiology with negative 

hepatitis panel, undergoing work-up for autoimmune hepatitis, with 

significantly elevated CA 19-9 at 869 (0-37) and CEA mildly elevated at 7.2 (0-5

).  AFP normal at 2.77 (0-7.21).  Repeat CA 19-9 decreased to 571 on 5/18/17.





- MRCP 5/13/17 demonstrated hepatomegaly at 20 cm with no focal hepatic mass is 

identified, hepatic periportal edema is noted, which can be seen in the setting 

of hepatitis.  Gallbladder is contracted, and demonstrates circumferential wall 

edema, likely reactive secondary to underlying liver disease.  Splenomegaly is 

noted measuring 14 cm.  Biliary tree, pancreas, adrenal glands and kidneys are 

unremarkable.  There is no retroperitoneal or lacy hepatis lymphadenopathy.  

Mild upper abdominal ascites is noted.  


- Appreciate GI recs.  s/p ERCP 5/7/17 that showed no filling defects in CBD, ?

gastroparesis for which Reglan was added.


- Will consider outpatient EUS to rule out pancreaticobiliary lesion. CA 19-9 

is significantly elevated and may be due to underlying inflammatory/autoimmune 

process, however would need to rule out underlying pancreaticobiliary 

malignancy such as pancreatic or intrahepatic cholangiocarcinoma.  Patient can 

follow-up with us as an outpatient for EUS/GI referral.  Pending auth for 

patient to follow up with me as an outpatient as well as for EUS/GI referral - 

may need to be referred by primary care physician per case management.


- s/p liver biopsy 5/19/17, prelim results:


Liver, needle core biopsies:


-- Chronic hepatitis with portal and lobular inflammation and interface activity

, cholangiolitis 


and portal, periportal, pericellular fibrosis and focal bridging fibrosis (see 

comment).


-- No malignancy or liver cirrhosis is identified.


 


COMMENT:  Autoimmune hepatitis and primary biliary cirrhosis are a 

consideration.  This case will 


be sent in consultation with Dr. Kevin Bull of [facility] and a final 

report will follow.


 





# Normocytic anemia, stable, iron panel consistent with anemia of chronic 

inflammation, B12/folate/TSH WNL, retic count inappropriately low, LDH 

elevated. Methylmalonic acid and homocysteine within normal limits.  

Haptoglobin was < 15, which may be secondary to liver disease.  Peripheral 

smear reviewed by path, showed anisocytosis, normal WBCs, no schistocytes or 

spherocytes. Ashley' test negative. 





# Polyarthritis, REID negative, anti-mitochondrial Ab neg, smooth muscle Ab 

negative, ds DNA elevated at 384.  Rheumatology follow-up.


Problems:  





Consultation Date/Type/Reason


Admit Date/Time


May 12, 2017 at 21:48


Initial Consult Date


5/15/17


Type of Consultation:  Oncology





24 HR Interval Summary


Free Text/Dictation


Patient doing well, no complaints.





Exam/Review of Systems


Vital Signs


Vitals





 Vital Signs








  Date Time  Temp Pulse Resp B/P Pulse Ox O2 Delivery O2 Flow Rate FiO2


 


5/26/17 07:00 97.9 59 20 98/56 98   














 Intake and Output   


 


 5/25/17 5/25/17 5/26/17





 15:00 23:00 07:00


 


Intake Total  1500 ml 2320 ml


 


Output Total   1050 ml


 


Balance  1500 ml 1270 ml











Exam


Constitutional:  alert, oriented


Psych:  nl mood/affect, no complaints


Head:  normocephalic


Eyes:  nl conjunctiva


ENMT:  nl external ears & nose, nl lips & teeth


Neck:  non-tender, supple


Respiratory:  clear to auscultation, normal air movement


Cardiovascular:  regular rate and rhythm


Gastrointestinal:  soft


Musculoskeletal:  nl extremities to inspection, nl gait and stance


Extremities:  normal pulses, mild diffuse rash





Results


Result Diagram:  


5/26/17 0420





Results 24 hrs





Laboratory Tests








Test


  5/26/17


04:20


 


Sodium Level 133  L


 


Potassium Level 4.2  


 


Chloride Level 107  


 


Carbon Dioxide Level 26  


 


Anion Gap 4  L


 


Blood Urea Nitrogen 7  


 


Creatinine 0.49  L


 


Glucose Level 97  


 


Calcium Level 8.5  


 


Total Bilirubin 0.5  


 


Direct Bilirubin 0.00  #


 


Indirect Bilirubin 0.5  


 


Aspartate Amino Transf


(AST/SGOT) 294  H


 


 


Alanine Aminotransferase


(ALT/SGPT) 405  H


 


 


Alkaline Phosphatase 210  H


 


Total Protein 6.0  L


 


Albumin 2.3  L


 


Globulin 3.70  H


 


Albumin/Globulin Ratio 0.62  











Medications


Medications





 Current Medications


Ondansetron HCl (Zofran Inj) 4 mg Q6H  PRN IV NAUSEA AND/OR VOMITING Last 

administered on 5/19/17at 15:48; Admin Dose 4 MG;  Start 5/12/17 at 22:30


Morphine Sulfate (morphine) 2 mg Q4H  PRN IV SEVERE PAIN LEVEL 7-10 Last 

administered on 5/19/17at 15:46; Admin Dose 2 MG;  Start 5/12/17 at 22:30


Famotidine 20 mg 20 mg Q12 PO  Last administered on 5/26/17at 08:38; Admin Dose 

20 MG;  Start 5/13/17 at 09:00


Sodium Chloride (NS) 1,000 ml @  70 mls/hr [id number] IV  Last administered on 5/26 /17at 02:08; Admin Dose 70 MLS/HR;  Start 5/19/17 at 00:00


Prednisone (Prednisone) 60 mg DAILY PO  Last administered on 5/26/17at 08:38; 

Admin Dose 60 MG;  Start 5/24/17 at 14:00











ALCIDES LOONEY MD May 26, 2017 10:24

## 2017-05-26 NOTE — PDOCDIS
Discharge Instructions


DIAGNOSIS


Discharge Diagnosis:  1. transaminitis from suspect autoimmune hepatitits





CONDITION


Patient Condition:  Stable





HOME CARE INSTRUCTIONS:


Special Diet:  REGULAR





FOLLOW UP/APPOINTMENTS


Appointments


1. Follow up with Dr. Christopher Pritchard in one week


2. Follow up with Dr. India Kaiser in 2 weeks


3. Follow up with your primary care provider in 1-2 weeks











POLLO HOOVER May 26, 2017 12:00

## 2017-05-27 VITALS — SYSTOLIC BLOOD PRESSURE: 106 MMHG | DIASTOLIC BLOOD PRESSURE: 53 MMHG | RESPIRATION RATE: 16 BRPM

## 2017-05-27 LAB
ALBUMIN SERPL-MCNC: 2.1 G/DL (ref 3.3–4.9)
ALBUMIN/GLOB SERPL: 0.56 {RATIO}
ALP SERPL-CCNC: 190 IU/L (ref 42–121)
ALT SERPL-CCNC: 328 IU/L (ref 13–69)
ANION GAP SERPL CALC-SCNC: 11 MMOL/L (ref 8–16)
AST SERPL-CCNC: 222 IU/L (ref 15–46)
BILIRUB DIRECT SERPL-MCNC: 0 MG/DL (ref 0–0.2)
BILIRUB SERPL-MCNC: 0.4 MG/DL (ref 0.2–1.3)
BUN SERPL-MCNC: 9 MG/DL (ref 7–20)
CALCIUM SERPL-MCNC: 8.2 MG/DL (ref 8.4–10.2)
CHLORIDE SERPL-SCNC: 103 MMOL/L (ref 97–110)
CO2 SERPL-SCNC: 27 MMOL/L (ref 21–31)
CREAT SERPL-MCNC: 0.54 MG/DL (ref 0.61–1.24)
GLOBULIN SER-MCNC: 3.7 G/DL (ref 1.3–3.2)
GLUCOSE SERPL-MCNC: 93 MG/DL (ref 70–220)
POTASSIUM SERPL-SCNC: 3.3 MMOL/L (ref 3.5–5.1)
PROT SERPL-MCNC: 5.8 G/DL (ref 6.1–8.1)
SODIUM SERPL-SCNC: 138 MMOL/L (ref 135–144)

## 2017-05-27 RX ADMIN — FAMOTIDINE SCH MG: 20 TABLET ORAL at 09:18

## 2017-05-27 NOTE — PN
Date/Time of Note


Date/Time of Note


DATE: 5/27/17 


TIME: 12:10





Assessment/Plan


VTE Prophylaxis


VTE Prophylaxis Intervention:  ambulation, SCD's





Lines/Catheters


IV Catheter Type (from [facility]):  Saline Lock


Urinary Cath still in place:  No





Assessment/Plan


Assessment/Plan


Jaundice/Transaminitis


               Biopsy liver


                    Autoimmune hepatitis


                   ERCP 5/17/2017 


                          Retained fluid in the stomach (suctioned out.)


                          ? gastroparesis.


                           Normal ampulla of Vater.  


                        .  Normal cholangiogram. 


                  MRI abdomen


                     Hepatomegaly is noted.  Hepatic periportal edema is 

identified, which can be seen in the setting of hepatitis.


                     Splenomegaly is noted measuring 14 cm.


                .  There is mild upper abdominal ascites.


               .  Circumferential gallbladder wall thickening is noted, likely 

reactive secondary to underlying liver disease.  No evidence of cholelithiasis, 

cholecystitis or 


                    biliary dilatation is identified.


                  There is mild anasarca.


* Elevated Ca 19-9


* Hx of asthma





Plan 


* Stable for outpatient management


* Steroid  60 mg daily and taper


* Follow up with Hepatology


* Case management to work up referral to hepatology or tertiary center





Subjective


24 Hr Interval Summary


Free Text/Dictation


* Course reviewed with RN


* patient seen and examined


* Biopsy results 


                    autoimmune hepatitis





Exam/Review of Systems


Vital Signs


Vitals





 Vital Signs








  Date Time  Temp Pulse Resp B/P Pulse Ox O2 Delivery O2 Flow Rate FiO2


 


5/27/17 08:08 98.3 65 16 106/53 100   














 Intake and Output   


 


 5/26/17 5/26/17 5/27/17





 15:00 23:00 07:00


 


Intake Total 600 ml 1200 ml 950 ml


 


Output Total  600 ml 900 ml


 


Balance 600 ml 600 ml 50 ml











Exam


Constitutional:  alert, oriented


Head:  normocephalic


Neck:  non-tender, supple


Respiratory:  clear to auscultation, normal air movement


Cardiovascular:  nl pulses, regular rate and rhythm


Gastrointestinal:  non-tender, soft


Musculoskeletal:  nl extremities to inspection





Results


Result Diagram:  


5/27/17 0440





Results 24 hrs





Laboratory Tests








Test


  5/27/17


04:40


 


Sodium Level 138  


 


Potassium Level 3.3  L


 


Chloride Level 103  


 


Carbon Dioxide Level 27  


 


Anion Gap 11  #


 


Blood Urea Nitrogen 9  


 


Creatinine 0.54  L


 


Glucose Level 93  


 


Calcium Level 8.2  L


 


Total Bilirubin 0.4  


 


Direct Bilirubin 0.00  


 


Indirect Bilirubin 0.4  


 


Aspartate Amino Transf


(AST/SGOT) 222  H


 


 


Alanine Aminotransferase


(ALT/SGPT) 328  H


 


 


Alkaline Phosphatase 190  H


 


Total Protein 5.8  L


 


Albumin 2.1  L


 


Globulin 3.70  H


 


Albumin/Globulin Ratio 0.56  











Medications


Medications





 Current Medications


Ondansetron HCl (Zofran Inj) 4 mg Q6H  PRN IV NAUSEA AND/OR VOMITING Last 

administered on 5/19/17at 15:48; Admin Dose 4 MG;  Start 5/12/17 at 22:30


Morphine Sulfate (morphine) 2 mg Q4H  PRN IV SEVERE PAIN LEVEL 7-10 Last 

administered on 5/19/17at 15:46; Admin Dose 2 MG;  Start 5/12/17 at 22:30


Famotidine (Pepcid) 20 mg Q12 PO  Last administered on 5/27/17at 09:18; Admin 

Dose 20 MG;  Start 5/13/17 at 09:00


Prednisone (Prednisone) 60 mg DAILY PO  Last administered on 5/27/17at 09:19; 

Admin Dose 60 MG;  Start 5/24/17 at 14:00











BRET SWAIN MD May 27, 2017 12:15

## 2017-05-27 NOTE — DS
Date/Time of Note


Date/Time of Note


DATE: 5/27/17 


TIME: 14:54





Discharge Summary


Admission/Discharge Info


Admit Date/Time


May 12, 2017 at 21:48


Discharge Date/Time





Final Diagnosis


1.  Hyperbilirubinemia with transaminitis suspect secondary to autoimmune 

hepatitis.


2.  Positive tumor markers. 


3.  Polyarthritis. 


4.  History of asthma.


Patient Condition:  Stable


Consults


1. Dr. Christopher Pritchard


2. Dr. Osborne To


Hx of Present Illness





*******************PLEASE SEE THE DICTATED H&P**************************


Hospital Course


This is a 22-year-old male with history of asthma who came to [facility] due to reports of abdominal pain vomiting jaundice and 

swollen hands.  According to the patient his symptoms had occurred 2 weeks 

prior to admission when he started noticing he was turning yellow.  He also had 

some abdominal pain in the right abdominal quadrant.  After then he also 

reported that about a year ago his he has had swelling in his joints.  And 

hands are also starting to get swollen.  He of note did report that he did 

travel back to [facility] about a year ago when those issues started.  Reports 

his abdominal pain has been getting worse over the past 2 weeks with vomiting 

nonbilious nonbloody emesis.  Reported he also states he lost 50 pounds in a 

year.  He states he is to 185 pounds.  He did come to [facility] due to the aformentiond issues.  Patient did have multidisciplinary 

consultation.  He was seen by gastroenterologist.  We did do hepatitis panel 

and he was negative.  Additionally we also tested him for his tumor markers he 

was noted with  elevated.  We did oncologist consultation but likely in 

the setting of inflammatory process with his seen hepatitis markers could have 

been altered.  Patient was with persistent hyperbilirubinemia and 

transaminitis.  We did get biopsy and findings were suggestive of autoimmune 

hepatitis.  We did start the patient on prednisone medication and he did have 

good response from this and his liver enzymes abdominal pain.  Is otherwise 

optimized medically.  For his polyarthritis of which etiology was unclear he 

was provided with analgesics and advised for outpatient consultation with 

rheumatology with a follow-up per his primary physician.  He did have a history 

of asthma but no active bronchospasm were noted.  His hyperbilirubinemia and 

transaminitis did improve during hospital stay.  Initially we did plan for 

patient follow-up with gastroenterologist patient did report that he had 

gastroenterologist that he was referred to by his PCP.  During his course of 

stay he did improve.  He did report resolution of his abdominal pain as well as 

nausea and vomiting.  He denied any for the pain.  The plan of care was 

discussed with the patient and his family and patient and family did verbalize 

their understanding.  On the day of discharge patient was in stable condition





Discussed plan of care with Dr. Rodriguez





Discharge process 40 minutes


Home Meds


Active Scripts


Prednisone* (Prednisone*) 20 Mg Tab, 60 MG PO DAILY for 30 Days, TAB


   1. take 60mg by mouth daily for 7 days, then


   2. take 40mg by mouth daily for 7 days, then


   3. take 30mg by mouth daily for 14 days, then


   4. take 20mg by mouth daily for 14 days


   Prov:POLLO HOOVER         5/26/17


Albuterol Sulfate* (Ventolin HFA*) 18 Gm Hfa.aer.ad, 2 PUFF INHALATION Q6H, #1 

INHALER 0 Refills


   Prov:MARYANN CHAKRABORTY PA-C         2/4/16


Reported Medications


Gabapentin* (Gabapentin*) 300 Mg Capsule, 300 MG PO TID, #90 CAP


   5/20/17


Ergocalciferol (Vitamin D2) (VITAMIN D2) 50,000 Unit Capsule, 67680 UNIT PO Q7D

, CAP


   EVERY FRIDAY 5/12/17


Follow-up Plan


CONDITION


Patient Condition:  Stable





HOME CARE INSTRUCTIONS:


Special Diet:  REGULAR





FOLLOW UP/APPOINTMENTS


Appointments


1. Follow up with Dr. Christopher Pritchard in one week


2. Follow up with Dr. India Kaiser in 2 weeks


3. Follow up with your primary care provider in 1-2 weeks


Primary Care Provider


Gary Herrera


Pending Labs





Laboratory Tests








Test


  5/27/17


04:40


 


Sodium Level


  138mmol/L


(135-144)


 


Potassium Level


  3.3mmol/L


(3.5-5.1)


 


Chloride Level


  103mmol/L


()


 


Carbon Dioxide Level


  27mmol/L


(21-31)


 


Anion Gap 11 (8-16) 


 


Blood Urea Nitrogen 9mg/dl (7-20) 


 


Creatinine


  0.54mg/dl


(0.61-1.24)


 


Glucose Level


  93mg/dl


()


 


Calcium Level


  8.2mg/dl


(8.4-10.2)


 


Total Bilirubin


  0.4mg/dl


(0.2-1.3)


 


Direct Bilirubin


  0.00mg/dl


(0.00-0.20)


 


Indirect Bilirubin


  0.4mg/dl


(0-1.1)


 


Aspartate Amino Transf


(AST/SGOT) 222IU/L


(15-46)


 


Alanine Aminotransferase


(ALT/SGPT) 328IU/L


(13-69)


 


Alkaline Phosphatase


  190IU/L


()


 


Total Protein


  5.8g/dl


(6.1-8.1)


 


Albumin


  2.1g/dl


(3.3-4.9)


 


Globulin


  3.70g/dl


(1.3-3.2)


 


Albumin/Globulin Ratio 0.56 

















POLLO HOOVER May 27, 2017 14:57

## 2018-12-08 ENCOUNTER — HOSPITAL ENCOUNTER (EMERGENCY)
Age: 23
Discharge: HOME | End: 2018-12-08

## 2018-12-08 ENCOUNTER — HOSPITAL ENCOUNTER (EMERGENCY)
Dept: HOSPITAL 91 - FTE | Age: 23
Discharge: HOME | End: 2018-12-08
Payer: COMMERCIAL

## 2018-12-08 DIAGNOSIS — R19.7: Primary | ICD-10-CM

## 2018-12-08 DIAGNOSIS — J45.909: ICD-10-CM

## 2018-12-08 LAB
ADD MAN DIFF?: NO
ADD UMIC: NO
ALANINE AMINOTRANSFERASE: 143 IU/L (ref 13–69)
ALBUMIN/GLOBULIN RATIO: 0.87
ALBUMIN: 3.4 G/DL (ref 3.3–4.9)
ALKALINE PHOSPHATASE: 78 IU/L (ref 42–121)
ANION GAP: 6 (ref 5–13)
ASPARTATE AMINO TRANSFERASE: 116 IU/L (ref 15–46)
BASOPHIL #: 0 10^3/UL (ref 0–0.1)
BASOPHILS %: 0 % (ref 0–2)
BILIRUBIN,DIRECT: 0 MG/DL (ref 0–0.2)
BILIRUBIN,TOTAL: 0.5 MG/DL (ref 0.2–1.3)
BLOOD UREA NITROGEN: 11 MG/DL (ref 7–20)
CALCIUM: 8.8 MG/DL (ref 8.4–10.2)
CARBON DIOXIDE: 29 MMOL/L (ref 21–31)
CHLORIDE: 102 MMOL/L (ref 97–110)
CREATININE: 0.55 MG/DL (ref 0.61–1.24)
EOSINOPHILS #: 0 10^3/UL (ref 0–0.5)
EOSINOPHILS %: 0.3 % (ref 0–7)
GLOBULIN: 3.9 G/DL (ref 1.3–3.2)
GLUCOSE: 115 MG/DL (ref 70–220)
HEMATOCRIT: 38.1 % (ref 42–52)
HEMOGLOBIN: 12.7 G/DL (ref 14–18)
IMMATURE GRANS #M: 0.01 10^3/UL (ref 0–0.03)
IMMATURE GRANS % (M): 0.3 % (ref 0–0.43)
LIPASE: 63 U/L (ref 23–300)
LYMPHOCYTES #: 1 10^3/UL (ref 0.8–2.9)
LYMPHOCYTES %: 25 % (ref 15–51)
MEAN CORPUSCULAR HEMOGLOBIN: 28.3 PG (ref 29–33)
MEAN CORPUSCULAR HGB CONC: 33.3 G/DL (ref 32–37)
MEAN CORPUSCULAR VOLUME: 84.9 FL (ref 82–101)
MEAN PLATELET VOLUME: 10.8 FL (ref 7.4–10.4)
MONOCYTE #: 0.5 10^3/UL (ref 0.3–0.9)
MONOCYTES %: 13.5 % (ref 0–11)
NEUTROPHIL #: 2.4 10^3/UL (ref 1.6–7.5)
NEUTROPHILS %: 60.9 % (ref 39–77)
NUCLEATED RED BLOOD CELLS #: 0 10^3/UL (ref 0–0)
NUCLEATED RED BLOOD CELLS%: 0 /100WBC (ref 0–0)
PLATELET COUNT: 209 10^3/UL (ref 140–415)
POTASSIUM: 4.1 MMOL/L (ref 3.5–5.1)
RED BLOOD COUNT: 4.49 10^6/UL (ref 4.7–6.1)
RED CELL DISTRIBUTION WIDTH: 13.6 % (ref 11.5–14.5)
SODIUM: 137 MMOL/L (ref 135–144)
TOTAL PROTEIN: 7.3 G/DL (ref 6.1–8.1)
UR ASCORBIC ACID: NEGATIVE MG/DL
UR BILIRUBIN (DIP): NEGATIVE MG/DL
UR BLOOD (DIP): NEGATIVE MG/DL
UR CLARITY: (no result)
UR COLOR: (no result)
UR GLUCOSE (DIP): NEGATIVE MG/DL
UR KETONES (DIP): NEGATIVE MG/DL
UR LEUKOCYTE ESTERASE (DIP): NEGATIVE LEU/UL
UR MUCUS: (no result) /HPF
UR NITRITE (DIP): NEGATIVE MG/DL
UR PH (DIP): 5 (ref 5–9)
UR RBC: 0 /HPF (ref 0–5)
UR SPECIFIC GRAVITY (DIP): 1.02 (ref 1–1.03)
UR TOTAL PROTEIN (DIP): NEGATIVE MG/DL
UR UROBILINOGEN (DIP): NEGATIVE MG/DL
UR WBC: 3 /HPF (ref 0–5)
WHITE BLOOD COUNT: 4 10^3/UL (ref 4.8–10.8)

## 2018-12-08 PROCEDURE — 81001 URINALYSIS AUTO W/SCOPE: CPT

## 2018-12-08 PROCEDURE — 36415 COLL VENOUS BLD VENIPUNCTURE: CPT

## 2018-12-08 PROCEDURE — 99284 EMERGENCY DEPT VISIT MOD MDM: CPT

## 2018-12-08 PROCEDURE — 80053 COMPREHEN METABOLIC PANEL: CPT

## 2018-12-08 PROCEDURE — 85025 COMPLETE CBC W/AUTO DIFF WBC: CPT

## 2018-12-08 PROCEDURE — 81003 URINALYSIS AUTO W/O SCOPE: CPT

## 2018-12-08 PROCEDURE — 83690 ASSAY OF LIPASE: CPT

## 2018-12-08 RX ADMIN — THIAMINE HYDROCHLORIDE 1 MLS/HR: 100 INJECTION, SOLUTION INTRAMUSCULAR; INTRAVENOUS at 15:47

## 2020-11-04 NOTE — ERA
ER Documentation


Chief Complaint


Date/Time


DATE: 17 


TIME: 21:01


Chief Complaint


noticed dark urines and conjunctival jaundice eyes x 2 weeks. loss wt





HPI


This is a 22-year-old male presenting to the emergency department for 

epigastric abdominal pain with nausea, vomiting and jaundice.  Patient states 

he has had symptoms for the past 2 weeks.  Patient has had frequent vomiting 

postprandially.  Nonbloody, nonbilious emesis.  No diarrhea or constipation.  

Patient states he noticed conjunctival jaundice and dark urine for the last 

week.  No dysuria or hematuria.  No urinary frequency or urgency.  Patient 

states he traveled to [facility] 1 year ago.  Patient is not up-to-date on his 

vaccines.  Patient unsure if he ever received hepatitis vaccines.  No fevers or 

chills.





ROS


All systems reviewed and are negative except as per history of present illness.





Medications


Home Meds


Active Scripts


Albuterol Sulfate* (Ventolin HFA*) 18 Gm Hfa.aer.ad, 2 PUFF INHALATION Q6H, #1 

INHALER 0 Refills


   Prov:MARYANN CHAKRABORTY PA-C         16


Reported Medications


Albuterol Sulfate* (Ventolin HFA*) 18 Gm Hfa.aer.ad


   13





Allergies


Allergies:  


Coded Allergies:  


     No Known Allergies (Verified  Allergy, Unknown, 17)





PMhx/Soc


History of Surgery:  No


Anesthesia Reaction:  No


Hx Neurological Disorder:  No


Hx Respiratory Disorders:  Yes (ASTHMA)


Hx Cardiac Disorders:  No


Hx Psychiatric Problems:  No


Hx Miscellaneous Medical Probl:  No


Hx Alcohol Use:  No


Hx Substance Use:  No


Hx Tobacco Use:  No





Physical Exam


Vitals





Vital Signs








  Date Time  Temp Pulse Resp B/P Pulse Ox O2 Delivery O2 Flow Rate FiO2


 


17 17:11 98.1 71 18 174/67 99   








Physical Exam


Const:             Alert, ill-appearing


Head:   Atraumatic 


Eyes:    Normal Conjunctiva


ENT:    Normal External Ears, Nose and Mouth.


Neck:               Full range of motion..~ No meningismus.


Resp:    Clear to auscultation bilaterally


Cardio:    Regular rate and rhythm, no murmurs


Abd:    Soft, non distended. Normal bowel sounds  Liver and spleen are not 

palpable.  There are no masses.  Tenderness to epigastric region.


Skin:    No petechiae or rashes


Back:    No midline or flank tenderness


Ext:    No cyanosis, or edema


Neur:    Awake and alert


Psych:    Normal Mood and Affect


Result Diagram:  


17 1835                                                                   

             17 1835





Results 24 hrs





 Laboratory Tests








Test


  17


18:35 17


18:42


 


White Blood Count 6.510^3/ul  


 


Red Blood Count 4.8510^6/ul  


 


Hemoglobin 13.7g/dl  


 


Hematocrit 41.7%  


 


Mean Corpuscular Volume 86.0fl  


 


Mean Corpuscular Hemoglobin 28.2pg  


 


Mean Corpuscular Hemoglobin


Concent 32.9g/dl 


  


 


 


Red Cell Distribution Width 15.7%  


 


Platelet Count 47966^3/UL  


 


Mean Platelet Volume 11.7fl  


 


Neutrophils % 55.1%  


 


Lymphocytes % 24.9%  


 


Monocytes % 13.1%  


 


Eosinophils % 6.1%  


 


Basophils % 0.3%  


 


Nucleated Red Blood Cells % 0.0/100WBC  


 


Neutrophils # 3.610^3/ul  


 


Lymphocytes # 1.610^3/ul  


 


Monocytes # 0.910^3/ul  


 


Eosinophils # 0.410^3/ul  


 


Basophils # 0.010^3/ul  


 


Nucleated Red Blood Cells # 0.010^3/ul  


 


Prothrombin Time 17.1Sec  


 


Prothrombin Time Ratio 1.3  


 


INR International Normalized


Ratio 1.39 


  


 


 


Sodium Level 135mmol/L  


 


Potassium Level 4.1mmol/L  


 


Chloride Level 98mmol/L  


 


Carbon Dioxide Level 29mmol/L  


 


Anion Gap 12  


 


Blood Urea Nitrogen 9mg/dl  


 


Creatinine 0.58mg/dl  


 


Glucose Level 87mg/dl  


 


Calcium Level 8.4mg/dl  


 


Total Bilirubin 4.8mg/dl  


 


Direct Bilirubin 3.60mg/dl  


 


Indirect Bilirubin 1.2mg/dl  


 


Aspartate Amino Transf


(AST/SGOT) 735IU/L 


  


 


 


Alanine Aminotransferase


(ALT/SGPT) 574IU/L 


  


 


 


Alkaline Phosphatase 239IU/L  


 


Total Protein 6.9g/dl  


 


Albumin 2.6g/dl  


 


Globulin 4.30g/dl  


 


Albumin/Globulin Ratio 0.60  


 


Lipase 180U/L  


 


Hepatitis B Surface Antigen NEGATIVE  


 


Hepatitis B Core Total


Antibody NEGATIVE 


  


 


 


Hepatitis C Antibody NEGATIVE  


 


Bedside Urine pH (LAB)  6.0 


 


Bedside Urine Protein (LAB)  1+ 


 


Bedside Urine Glucose (UA)  0.1% 


 


Bedside Urine Ketones (LAB)  Negative 


 


Bedside Urine Blood  Negative 


 


Bedside Urine Nitrite (LAB)  Negative 


 


Bedside Urine Leukocyte


Esterase (L 


  Negative 


 








 Current Medications








 Medications


  (Trade)  Dose


 Ordered  Sig/Mely


 Route


 PRN Reason  Start Time


 Stop Time Status Last Admin


Dose Admin


 


 Sodium Chloride


  (NS)  1,000 ml @ 


 1,000 mls/hr  Q1H STAT


 IV


   17 17:33


 17 18:32 DC 17 18:49


 


 


 Ondansetron HCl


  (Zofran Inj)  4 mg  ONCE  STAT


 IV


   17 17:33


 17 17:43 DC 17 18:49


 


 


 Famotidine


  (Pepcid Iv)  20 mg  ONCE  STAT


 IV


   17 17:33


 17 17:43 DC 17 18:49


 


 


 IV Flush 10 ml  10 ml  STK-MED ONCE


 .ROUTE


   17 20:09


 17 20:10 DC 17 20:30


 


 


 Sodium Chloride


  (NS)  100 ml @ ud  STK-MED ONCE


 .ROUTE


   17 20:09


 17 20:10 DC 17 20:30


 


 


 Iodixanol


  (Visipaque Locm)  100 ml  STK-MED ONCE


 .ROUTE


   17 20:10


 17 20:11 DC 17 20:30


 


 


 Ondansetron HCl


  (Zofran Inj)  4 mg  BRIDGE ORDER PRN


 IV


 NAUSEA AND/OR VOMITING  17 22:00


 17 21:59   


 


 


 Acetaminophen


  (Tylenol Tab)  650 mg  ER BRIDGE PRN


 PO


 MILD PAIN/FEVER  17 22:00


 17 21:59   


 


 


 IV Flush


  (NS 3 ml)  3 ml  PER PROTOCOL


 IV


   17 22:30


     


 


 


 Ondansetron HCl


  (Zofran Inj)  4 mg  Q6H  PRN


 IV


 NAUSEA AND/OR VOMITING  17 22:30


     


 


 


 Morphine Sulfate


  (morphine)  2 mg  Q4H  PRN


 IV


 SEVERE PAIN LEVEL 7-10  17 22:30


     


 


 


 Famotidine


  (Pepcid)  20 mg  Q12


 PO


   17 09:00


     


 


 


 Albuterol/


 Ipratropium


  (Duoneb)  3 ml  Q2H RESP THERAPY  PRN


 HHN


 SHORTNESS OF BREATH  17 22:30


     


 


 


 Albuterol


  (Ventolin Hfa)  2 puff  Q6H RESP  THERAPY


 INH


   17 22:15


     


 











Procedures/MDM


ED COURSE:


The patient was stable throughout ED course. I kept the patient and/or family 

informed of laboratory and diagnostic imaging results throughout the ED course.

  


Patient: ANNA IBARRA   : 1995   Age: 22  Sex: M                      

  


 MR #:    [id number]   Acct #:   [id number]    DOS: 17 1733


 Ordering MD: MARCELA VAUGHN NP   Location:  [facility]   Room/Bed:                    

                        


 








PROCEDURE:   US Abdomen. 


 


CLINICAL INDICATION:   abdominal pain 


 


TECHNIQUE:   Multiple real-time images were acquired of the patient's right 

upper quadrant abdomen and retroperitoneum utilizing a high resolution 

transducer. 


 


COMPARISON:   None 


 


FINDINGS:


 


The liver demonstrates normal echogenicity.  The liver is normal in size and no 

focal solid lesions are seen. The liver measures 15.2 cm in length. The portal 

vein is patent with normal direction of flow.  No intrahepatic biliary 

dilatation is seen. 


 


The gallbladder is contracted and not well seen.  No gallstones are identified 

within the gallbladder.  There is no pericholecystic fluid or gallbladder wall 

thickening. The common bile duct measures 3 mm in maximal dimension.  


 


The pancreas appears enlarged and prominent.


 


No free fluid is identified.


 


The right kidney is normal in size, and demonstrate normal echogenicity and 

cortical thickness.  The right kidney measures 10.4 cm in long dimension.  

There is no evidence of hydronephrosis. There are no kidney stones.  


 


There is a trace amount of right perinephric fluid.


 


 


RPTAT: AA


 


IMPRESSION:


 


Limited visualization of a contracted gallbladder.


No evidence of gallstones.


Possible trace amount of right perinephric fluid.


Enlarged and prominent pancreas.  Rule out pancreatitis.  Further evaluation 

with CT is recommended.





Patient: ANNA IBARRA   : 1995   Age: 22  Sex: M                      

  


 MR #:    [id number]   [id number]    DOS: 17 1817


 Ordering MD: LALITA GUAN MD   Location:  [facility]   Room/Bed:                 

                           


 








PROCEDURE:   CT Abdomen and Pelvis with contrast. 


 


CLINICAL INDICATION:   Abdominal pain jaundice 


 


TECHNIQUE:   CT scan of the abdomen and pelvis with contrast was performed on a 

multi-detector high-resolution CT scanner.  The patient was scanned following 

the intravenous administration of 98 cc of Visipaque 320.  Coronal and sagittal 

reformatted images were obtained from the axial source images. Images were 

reviewed on a high-resolution PACS workstation. The total exam CTDI equals 4.83 

mGy and the total exam DLP equals 308.52 mGy-cm.


 


One or more the following dose reduction techniques were utilized: Automated 

exposure control, adjustment of the mA/ or kV according to patient's size, or 

use of iterative reconstruction technique.


 


COMPARISON:   Right upper quadrant abdominal ultrasound of 2017 


 


FINDINGS:


 


CT abdomen:


 


The lung bases are clear.  The heart size is normal, without pericardial 

thickening or effusion. Nonspecific mild periportal edema.  The length of the 

right lobe of the liver equals 20.2 cm which could be secondary to Reidel's lobe

, normal variant.  The spleen is normal in size and homogeneous in density.  

The stomach is partially collapsed, but is grossly unremarkable.  The pancreas 

as visualized is normal.  There is appearance of likely diffuse gallbladder 

wall edema.  The adrenal glands are symmetric and normal.  The kidneys are 

symmetrically unremarkable..  No renal calculus or obstructive uropathy or mass 

lesion is seen.  


 


The aorta is of normal caliber.   There is no retroperitoneal lymphadenopathy. 

   Small amount of ascites.


 


CT pelvis:


 


The small bowel loops situated within the pelvis are unremarkable.  The pelvic 

organs are normal.  The pelvic sidewalls and inguinal regions are clear.  The 

sigmoid colon and rectum are unremarkable.  No mass or lymphadenopathy is seen.

  Small amount of ascites.  The bladder is normal.


 


There is appearance of mild curvature of the lumbar spine with convexity to the 

left which could be positional. No osteolytic or osteoblastic lesion is 

detected.  


 


IMPRESSION:


Diffuse gallbladder wall edema.  Nonspecific mild periportal edema. Small 

amount of ascites. Findings could be secondary to hepatitis, hepatic 

congestion. Please see above.


 





MDM: This is a 22-year-old male presenting to emergency department for 

epigastric pain, vomiting, conjunctival jaundice 2 weeks.  Patient states he 

is unable to tolerate p.o. intake.  Patient states due to this he has had 

weight loss in the past 2 weeks.  No fevers or chills.  Patient is afebrile 

upon arrival to ED.  Due to patient's history and physical exam, labs  and 

gallbladder US are ordered.  Gallbladder ultrasound reviewed by radiologist as 

limited visualization of a contracted gallbladder.  No evidence of gallstones.  

Possible trace amount of right perinephric fluid.


Enlarged and prominent pancreas.  Rule out pancreatitis.  Further evaluation 

with CT is recommended.


No significant anemia or infection.  However ,  and alkaline 

phosphatase 239.  This is a significant elevation in liver enzymes and 

therefore a CT abdomen and pelvis with contrast is ordered.  CT abdomen pelvis 

reviewed by radiologist as diffuse gallbladder wall edema.  Nonspecific mild.  

Portal edema.  Small amount of ascites.  Findings could be secondary to 

hepatitis, hepatitic congestion.  Consulted Dr. Dukes regarding these 

findings. Patient appropriate for transfer to higher level of care and 

admission to hospital.





Departure


Diagnosis:  


 Primary Impression:  


 Hepatitis


Condition:  Stable











MARCELA VAUGHN NP May 12, 2017 21:09
extra-ocular movements intact/cranial nerves 2-12 intact/tongue is midline